# Patient Record
Sex: FEMALE | Race: ASIAN | NOT HISPANIC OR LATINO
[De-identification: names, ages, dates, MRNs, and addresses within clinical notes are randomized per-mention and may not be internally consistent; named-entity substitution may affect disease eponyms.]

---

## 2017-02-03 ENCOUNTER — APPOINTMENT (OUTPATIENT)
Dept: COLORECTAL SURGERY | Facility: CLINIC | Age: 47
End: 2017-02-03

## 2017-02-03 VITALS
HEIGHT: 63 IN | WEIGHT: 120 LBS | SYSTOLIC BLOOD PRESSURE: 111 MMHG | TEMPERATURE: 98.2 F | BODY MASS INDEX: 21.26 KG/M2 | RESPIRATION RATE: 14 BRPM | HEART RATE: 83 BPM | DIASTOLIC BLOOD PRESSURE: 75 MMHG

## 2017-02-03 DIAGNOSIS — Z12.11 ENCOUNTER FOR SCREENING FOR MALIGNANT NEOPLASM OF COLON: ICD-10-CM

## 2017-02-23 ENCOUNTER — MED ADMIN CHARGE (OUTPATIENT)
Age: 47
End: 2017-02-23

## 2017-03-23 ENCOUNTER — OUTPATIENT (OUTPATIENT)
Dept: OUTPATIENT SERVICES | Facility: HOSPITAL | Age: 47
LOS: 1 days | Discharge: HOME | End: 2017-03-23
Payer: COMMERCIAL

## 2017-03-23 VITALS
SYSTOLIC BLOOD PRESSURE: 110 MMHG | TEMPERATURE: 98 F | HEART RATE: 67 BPM | DIASTOLIC BLOOD PRESSURE: 79 MMHG | WEIGHT: 132.28 LBS | RESPIRATION RATE: 16 BRPM | OXYGEN SATURATION: 100 % | HEIGHT: 63 IN

## 2017-03-23 DIAGNOSIS — D25.9 LEIOMYOMA OF UTERUS, UNSPECIFIED: ICD-10-CM

## 2017-03-23 DIAGNOSIS — Z98.890 OTHER SPECIFIED POSTPROCEDURAL STATES: Chronic | ICD-10-CM

## 2017-03-23 DIAGNOSIS — Z12.11 ENCOUNTER FOR SCREENING FOR MALIGNANT NEOPLASM OF COLON: ICD-10-CM

## 2017-03-23 LAB
ANION GAP SERPL CALC-SCNC: 7 MMOL/L — SIGNIFICANT CHANGE UP (ref 5–17)
APPEARANCE UR: CLEAR — SIGNIFICANT CHANGE UP
APTT BLD: 34.7 SEC — SIGNIFICANT CHANGE UP (ref 27.5–37.4)
BACTERIA # UR AUTO: (no result)
BASOPHILS # BLD AUTO: 0.1 K/UL — SIGNIFICANT CHANGE UP (ref 0–0.2)
BASOPHILS NFR BLD AUTO: 1.1 % — SIGNIFICANT CHANGE UP (ref 0–2)
BILIRUB UR-MCNC: NEGATIVE — SIGNIFICANT CHANGE UP
BLD GP AB SCN SERPL QL: SIGNIFICANT CHANGE UP
BUN SERPL-MCNC: 19 MG/DL — SIGNIFICANT CHANGE UP (ref 7–23)
CALCIUM SERPL-MCNC: 8.6 MG/DL — SIGNIFICANT CHANGE UP (ref 8.5–10.1)
CHLORIDE SERPL-SCNC: 106 MMOL/L — SIGNIFICANT CHANGE UP (ref 96–108)
CO2 SERPL-SCNC: 27 MMOL/L — SIGNIFICANT CHANGE UP (ref 22–31)
COLOR SPEC: YELLOW — SIGNIFICANT CHANGE UP
CREAT SERPL-MCNC: 0.54 MG/DL — SIGNIFICANT CHANGE UP (ref 0.5–1.3)
DIFF PNL FLD: (no result)
EOSINOPHIL # BLD AUTO: 0.1 K/UL — SIGNIFICANT CHANGE UP (ref 0–0.5)
EOSINOPHIL NFR BLD AUTO: 2.5 % — SIGNIFICANT CHANGE UP (ref 0–6)
EPI CELLS # UR: SIGNIFICANT CHANGE UP
GLUCOSE SERPL-MCNC: 103 MG/DL — HIGH (ref 70–99)
GLUCOSE UR QL: NEGATIVE MG/DL — SIGNIFICANT CHANGE UP
HCT VFR BLD CALC: 34.4 % — LOW (ref 34.5–45)
HGB BLD-MCNC: 10.5 G/DL — LOW (ref 11.5–15.5)
INR BLD: 0.95 RATIO — SIGNIFICANT CHANGE UP (ref 0.88–1.16)
KETONES UR-MCNC: NEGATIVE — SIGNIFICANT CHANGE UP
LEUKOCYTE ESTERASE UR-ACNC: (no result)
LYMPHOCYTES # BLD AUTO: 1.6 K/UL — SIGNIFICANT CHANGE UP (ref 1–3.3)
LYMPHOCYTES # BLD AUTO: 28.5 % — SIGNIFICANT CHANGE UP (ref 13–44)
MCHC RBC-ENTMCNC: 23.7 PG — LOW (ref 27–34)
MCHC RBC-ENTMCNC: 30.4 GM/DL — LOW (ref 32–36)
MCV RBC AUTO: 77.9 FL — LOW (ref 80–100)
MONOCYTES # BLD AUTO: 0.4 K/UL — SIGNIFICANT CHANGE UP (ref 0–0.9)
MONOCYTES NFR BLD AUTO: 7.8 % — SIGNIFICANT CHANGE UP (ref 2–14)
NEUTROPHILS # BLD AUTO: 3.3 K/UL — SIGNIFICANT CHANGE UP (ref 1.8–7.4)
NEUTROPHILS NFR BLD AUTO: 60.2 % — SIGNIFICANT CHANGE UP (ref 43–77)
NITRITE UR-MCNC: NEGATIVE — SIGNIFICANT CHANGE UP
PH UR: 5 — SIGNIFICANT CHANGE UP (ref 4.8–8)
PLATELET # BLD AUTO: 309 K/UL — SIGNIFICANT CHANGE UP (ref 150–400)
POTASSIUM SERPL-MCNC: 4.3 MMOL/L — SIGNIFICANT CHANGE UP (ref 3.5–5.3)
POTASSIUM SERPL-SCNC: 4.3 MMOL/L — SIGNIFICANT CHANGE UP (ref 3.5–5.3)
PROT UR-MCNC: NEGATIVE MG/DL — SIGNIFICANT CHANGE UP
PROTHROM AB SERPL-ACNC: 10.3 SEC — SIGNIFICANT CHANGE UP (ref 9.8–12.7)
RBC # BLD: 4.42 M/UL — SIGNIFICANT CHANGE UP (ref 3.8–5.2)
RBC # FLD: 13.6 % — SIGNIFICANT CHANGE UP (ref 10.3–14.5)
RBC CASTS # UR COMP ASSIST: (no result) /HPF (ref 0–4)
SODIUM SERPL-SCNC: 140 MMOL/L — SIGNIFICANT CHANGE UP (ref 135–145)
SP GR SPEC: 1.01 — SIGNIFICANT CHANGE UP (ref 1.01–1.02)
TYPE + AB SCN PNL BLD: SIGNIFICANT CHANGE UP
UROBILINOGEN FLD QL: NEGATIVE MG/DL — SIGNIFICANT CHANGE UP
WBC # BLD: 5.4 K/UL — SIGNIFICANT CHANGE UP (ref 3.8–10.5)
WBC # FLD AUTO: 5.4 K/UL — SIGNIFICANT CHANGE UP (ref 3.8–10.5)
WBC UR QL: SIGNIFICANT CHANGE UP

## 2017-03-23 PROCEDURE — 93010 ELECTROCARDIOGRAM REPORT: CPT

## 2017-03-23 NOTE — H&P PST ADULT - HISTORY OF PRESENT ILLNESS
1.	This is a 45 y/o female with a hx of Right LE DVT S/P surgery (removal of DVT) on Right LE (not on anticoagulation). Pt reports she has a regular menstrual cycle that occurs approximately every 30 days. She reports that on the second day she has increased bleeding and abdominal pain when she is unable to walk. She was diagnosed with fibroids and is now scheduled for a supracervical hysterectomy.

## 2017-03-23 NOTE — H&P PST ADULT - NEGATIVE GENERAL GENITOURINARY SYMPTOMS
no flank pain L/no renal colic/no hematuria/no dysuria/no flank pain R/no incontinence/no bladder infections/no urinary hesitancy

## 2017-03-23 NOTE — H&P PST ADULT - ASSESSMENT
This is a 45 y/o female with menorrhagia scheduled for a supracervical hysterectomy with Dr. Vargas This is a 45 y/o female with menorrhagia scheduled for a supracervical hysterectomy with Dr. Vargas    Patient instructed on     1. NPO post midnight of surgery  2. On the use of EZ sponges  3. Aware that she needs medical clearance

## 2017-03-23 NOTE — H&P PST ADULT - PSH
H/O abdominal surgery  2008  H/O vascular surgery  S/P evacuation of blood clot in right leg 11/2016

## 2017-03-29 ENCOUNTER — RESULT REVIEW (OUTPATIENT)
Age: 47
End: 2017-03-29

## 2017-03-29 RX ORDER — ACETAMINOPHEN 500 MG
975 TABLET ORAL ONCE
Qty: 0 | Refills: 0 | Status: COMPLETED | OUTPATIENT
Start: 2017-03-30 | End: 2017-03-30

## 2017-03-29 RX ORDER — CELECOXIB 200 MG/1
200 CAPSULE ORAL ONCE
Qty: 0 | Refills: 0 | Status: COMPLETED | OUTPATIENT
Start: 2017-03-30 | End: 2017-03-30

## 2017-03-29 RX ORDER — OXYCODONE HYDROCHLORIDE 5 MG/1
10 TABLET ORAL ONCE
Qty: 0 | Refills: 0 | Status: DISCONTINUED | OUTPATIENT
Start: 2017-03-30 | End: 2017-03-30

## 2017-03-29 RX ORDER — SODIUM CHLORIDE 9 MG/ML
3 INJECTION INTRAMUSCULAR; INTRAVENOUS; SUBCUTANEOUS EVERY 8 HOURS
Qty: 0 | Refills: 0 | Status: DISCONTINUED | OUTPATIENT
Start: 2017-03-30 | End: 2017-04-01

## 2017-03-30 ENCOUNTER — APPOINTMENT (OUTPATIENT)
Dept: COLORECTAL SURGERY | Facility: HOSPITAL | Age: 47
End: 2017-03-30

## 2017-03-30 ENCOUNTER — INPATIENT (INPATIENT)
Facility: HOSPITAL | Age: 47
LOS: 1 days | Discharge: HOME | End: 2017-04-01
Attending: OBSTETRICS & GYNECOLOGY | Admitting: OBSTETRICS & GYNECOLOGY
Payer: COMMERCIAL

## 2017-03-30 ENCOUNTER — APPOINTMENT (OUTPATIENT)
Dept: OBGYN | Facility: HOSPITAL | Age: 47
End: 2017-03-30

## 2017-03-30 VITALS
RESPIRATION RATE: 15 BRPM | OXYGEN SATURATION: 98 % | HEART RATE: 68 BPM | WEIGHT: 132.28 LBS | DIASTOLIC BLOOD PRESSURE: 80 MMHG | TEMPERATURE: 98 F | SYSTOLIC BLOOD PRESSURE: 113 MMHG

## 2017-03-30 DIAGNOSIS — Z98.890 OTHER SPECIFIED POSTPROCEDURAL STATES: Chronic | ICD-10-CM

## 2017-03-30 LAB
ANION GAP SERPL CALC-SCNC: 9 MMOL/L — SIGNIFICANT CHANGE UP (ref 5–17)
BASOPHILS # BLD AUTO: 0 K/UL — SIGNIFICANT CHANGE UP (ref 0–0.2)
BASOPHILS NFR BLD AUTO: 0.3 % — SIGNIFICANT CHANGE UP (ref 0–2)
BUN SERPL-MCNC: 14 MG/DL — SIGNIFICANT CHANGE UP (ref 7–23)
CALCIUM SERPL-MCNC: 7.7 MG/DL — LOW (ref 8.5–10.1)
CHLORIDE SERPL-SCNC: 108 MMOL/L — SIGNIFICANT CHANGE UP (ref 96–108)
CO2 SERPL-SCNC: 23 MMOL/L — SIGNIFICANT CHANGE UP (ref 22–31)
CREAT SERPL-MCNC: 0.52 MG/DL — SIGNIFICANT CHANGE UP (ref 0.5–1.3)
EOSINOPHIL # BLD AUTO: 0 K/UL — SIGNIFICANT CHANGE UP (ref 0–0.5)
EOSINOPHIL NFR BLD AUTO: 0 % — SIGNIFICANT CHANGE UP (ref 0–6)
GLUCOSE SERPL-MCNC: 104 MG/DL — HIGH (ref 70–99)
HBA1C BLD-MCNC: 5.9 % — HIGH (ref 4–5.6)
HCG UR QL: NEGATIVE — SIGNIFICANT CHANGE UP
HCT VFR BLD CALC: 32.3 % — LOW (ref 34.5–45)
HGB BLD-MCNC: 10 G/DL — LOW (ref 11.5–15.5)
LYMPHOCYTES # BLD AUTO: 0.6 K/UL — LOW (ref 1–3.3)
LYMPHOCYTES # BLD AUTO: 5.4 % — LOW (ref 13–44)
MCHC RBC-ENTMCNC: 23.7 PG — LOW (ref 27–34)
MCHC RBC-ENTMCNC: 31.1 GM/DL — LOW (ref 32–36)
MCV RBC AUTO: 76.2 FL — LOW (ref 80–100)
MONOCYTES # BLD AUTO: 0.2 K/UL — SIGNIFICANT CHANGE UP (ref 0–0.9)
MONOCYTES NFR BLD AUTO: 1.6 % — LOW (ref 2–14)
NEUTROPHILS # BLD AUTO: 11.2 K/UL — HIGH (ref 1.8–7.4)
NEUTROPHILS NFR BLD AUTO: 92.8 % — HIGH (ref 43–77)
PLATELET # BLD AUTO: 382 K/UL — SIGNIFICANT CHANGE UP (ref 150–400)
POTASSIUM SERPL-MCNC: 4.1 MMOL/L — SIGNIFICANT CHANGE UP (ref 3.5–5.3)
POTASSIUM SERPL-SCNC: 4.1 MMOL/L — SIGNIFICANT CHANGE UP (ref 3.5–5.3)
RBC # BLD: 4.24 M/UL — SIGNIFICANT CHANGE UP (ref 3.8–5.2)
RBC # FLD: 13.4 % — SIGNIFICANT CHANGE UP (ref 10.3–14.5)
SODIUM SERPL-SCNC: 140 MMOL/L — SIGNIFICANT CHANGE UP (ref 135–145)
WBC # BLD: 12.1 K/UL — HIGH (ref 3.8–10.5)
WBC # FLD AUTO: 12.1 K/UL — HIGH (ref 3.8–10.5)

## 2017-03-30 PROCEDURE — 88307 TISSUE EXAM BY PATHOLOGIST: CPT | Mod: 26

## 2017-03-30 PROCEDURE — 44005 FREEING OF BOWEL ADHESION: CPT

## 2017-03-30 PROCEDURE — 58180 PARTIAL HYSTERECTOMY: CPT

## 2017-03-30 RX ORDER — ONDANSETRON 8 MG/1
4 TABLET, FILM COATED ORAL ONCE
Qty: 0 | Refills: 0 | Status: DISCONTINUED | OUTPATIENT
Start: 2017-03-30 | End: 2017-03-30

## 2017-03-30 RX ORDER — SODIUM CHLORIDE 9 MG/ML
1000 INJECTION INTRAMUSCULAR; INTRAVENOUS; SUBCUTANEOUS
Qty: 0 | Refills: 0 | Status: DISCONTINUED | OUTPATIENT
Start: 2017-03-30 | End: 2017-03-30

## 2017-03-30 RX ORDER — ONDANSETRON 8 MG/1
4 TABLET, FILM COATED ORAL EVERY 6 HOURS
Qty: 0 | Refills: 0 | Status: DISCONTINUED | OUTPATIENT
Start: 2017-03-30 | End: 2017-04-01

## 2017-03-30 RX ORDER — HEPARIN SODIUM 5000 [USP'U]/ML
5000 INJECTION INTRAVENOUS; SUBCUTANEOUS EVERY 12 HOURS
Qty: 0 | Refills: 0 | Status: DISCONTINUED | OUTPATIENT
Start: 2017-03-30 | End: 2017-03-31

## 2017-03-30 RX ORDER — SODIUM CHLORIDE 9 MG/ML
1000 INJECTION, SOLUTION INTRAVENOUS
Qty: 0 | Refills: 0 | Status: DISCONTINUED | OUTPATIENT
Start: 2017-03-30 | End: 2017-04-01

## 2017-03-30 RX ORDER — HYDROMORPHONE HYDROCHLORIDE 2 MG/ML
30 INJECTION INTRAMUSCULAR; INTRAVENOUS; SUBCUTANEOUS
Qty: 0 | Refills: 0 | Status: DISCONTINUED | OUTPATIENT
Start: 2017-03-30 | End: 2017-03-31

## 2017-03-30 RX ORDER — NALOXONE HYDROCHLORIDE 4 MG/.1ML
0.1 SPRAY NASAL
Qty: 0 | Refills: 0 | Status: DISCONTINUED | OUTPATIENT
Start: 2017-03-30 | End: 2017-04-01

## 2017-03-30 RX ORDER — HYDROMORPHONE HYDROCHLORIDE 2 MG/ML
0.5 INJECTION INTRAMUSCULAR; INTRAVENOUS; SUBCUTANEOUS
Qty: 0 | Refills: 0 | Status: DISCONTINUED | OUTPATIENT
Start: 2017-03-30 | End: 2017-03-31

## 2017-03-30 RX ADMIN — SODIUM CHLORIDE 100 MILLILITER(S): 9 INJECTION, SOLUTION INTRAVENOUS at 22:04

## 2017-03-30 RX ADMIN — ONDANSETRON 4 MILLIGRAM(S): 8 TABLET, FILM COATED ORAL at 20:24

## 2017-03-30 RX ADMIN — CELECOXIB 200 MILLIGRAM(S): 200 CAPSULE ORAL at 06:48

## 2017-03-30 RX ADMIN — OXYCODONE HYDROCHLORIDE 10 MILLIGRAM(S): 5 TABLET ORAL at 06:48

## 2017-03-30 RX ADMIN — Medication 975 MILLIGRAM(S): at 06:48

## 2017-03-30 RX ADMIN — SODIUM CHLORIDE 100 MILLILITER(S): 9 INJECTION, SOLUTION INTRAVENOUS at 12:20

## 2017-03-30 RX ADMIN — HEPARIN SODIUM 5000 UNIT(S): 5000 INJECTION INTRAVENOUS; SUBCUTANEOUS at 18:00

## 2017-03-30 NOTE — PATIENT PROFILE ADULT. - FUNCTIONAL SCREEN CURRENT LEVEL: COMMUNICATION, MLM
(0) understands/communicates without difficulty/refuses translation phone.  understands and speaks english well

## 2017-03-30 NOTE — BRIEF OPERATIVE NOTE - PROCEDURE
Bilateral salpingectomy by abdominal approach  03/30/2017    Active  [email or web address]  Abdominal hysterectomy  03/30/2017    Active  [email or web address]

## 2017-03-30 NOTE — BRIEF OPERATIVE NOTE - POST-OP DX
Menorrhagia with regular cycle  03/30/2017    Active  Jan Vargas  Uterine leiomyoma, unspecified location  03/30/2017    Active  Jan Vargas

## 2017-03-31 PROCEDURE — 99222 1ST HOSP IP/OBS MODERATE 55: CPT

## 2017-03-31 RX ORDER — ACETAMINOPHEN 500 MG
650 TABLET ORAL EVERY 6 HOURS
Qty: 0 | Refills: 0 | Status: DISCONTINUED | OUTPATIENT
Start: 2017-03-31 | End: 2017-04-01

## 2017-03-31 RX ORDER — ENOXAPARIN SODIUM 100 MG/ML
40 INJECTION SUBCUTANEOUS DAILY
Qty: 0 | Refills: 0 | Status: DISCONTINUED | OUTPATIENT
Start: 2017-03-31 | End: 2017-04-01

## 2017-03-31 RX ORDER — HYDROMORPHONE HYDROCHLORIDE 2 MG/ML
2 INJECTION INTRAMUSCULAR; INTRAVENOUS; SUBCUTANEOUS
Qty: 0 | Refills: 0 | Status: DISCONTINUED | OUTPATIENT
Start: 2017-03-31 | End: 2017-04-01

## 2017-03-31 RX ORDER — ENOXAPARIN SODIUM 100 MG/ML
40 INJECTION SUBCUTANEOUS
Qty: 30 | Refills: 0 | OUTPATIENT
Start: 2017-03-31 | End: 2017-04-30

## 2017-03-31 RX ADMIN — HYDROMORPHONE HYDROCHLORIDE 2 MILLIGRAM(S): 2 INJECTION INTRAMUSCULAR; INTRAVENOUS; SUBCUTANEOUS at 14:44

## 2017-03-31 RX ADMIN — ENOXAPARIN SODIUM 40 MILLIGRAM(S): 100 INJECTION SUBCUTANEOUS at 13:01

## 2017-03-31 RX ADMIN — HEPARIN SODIUM 5000 UNIT(S): 5000 INJECTION INTRAVENOUS; SUBCUTANEOUS at 05:57

## 2017-03-31 RX ADMIN — HYDROMORPHONE HYDROCHLORIDE 2 MILLIGRAM(S): 2 INJECTION INTRAMUSCULAR; INTRAVENOUS; SUBCUTANEOUS at 15:07

## 2017-03-31 RX ADMIN — SODIUM CHLORIDE 100 MILLILITER(S): 9 INJECTION, SOLUTION INTRAVENOUS at 16:14

## 2017-03-31 RX ADMIN — SODIUM CHLORIDE 100 MILLILITER(S): 9 INJECTION, SOLUTION INTRAVENOUS at 05:55

## 2017-03-31 RX ADMIN — Medication 650 MILLIGRAM(S): at 18:33

## 2017-03-31 RX ADMIN — SODIUM CHLORIDE 3 MILLILITER(S): 9 INJECTION INTRAMUSCULAR; INTRAVENOUS; SUBCUTANEOUS at 20:34

## 2017-03-31 NOTE — CHART NOTE - NSCHARTNOTEFT_GEN_A_CORE
Postoperative day: 1 Subtotal hysterectomy,bilateral salpingectomies    Subjective:  The patient feels well.  She is ambulating.  She is tolerating a regular diet.  She denies nausea and vomiting.  She is passing flatus.  She denies chest pain or shortness of breath.    Physical exam:    Vital Signs Last 24 Hrs  T(C): 37.1, Max: 37.1 (03-30 @ 21:05)  T(F): 98.8, Max: 98.8 (03-30 @ 21:05)  HR: 83 (67 - 85)  BP: 115/70 (107/65 - 132/85)  BP(mean): --  RR: 16 (13 - 18)  SpO2: 100% (99% - 100%)    Gen: NAD  Abdomen: Soft,  appropriately tender  Incision is clean dry and intact  Ext: No calf tenderness  SCD's in place and working    LABS:                        10.0   12.1  )-----------( 382      ( 30 Mar 2017 12:16 )             32.3     30 Mar 2017 12:16    140    |  108    |  14     ----------------------------<  104    4.1     |  23     |  0.52     Ca    7.7        30 Mar 2017 12:16        Allergies    No Known Allergies    Intolerances        RADIOLOGY & ADDITIONAL TESTS:    Assessment and Plan  POD # 1 s/p ELAP, subtotal hysterectomy, bilateral salpingectomies  Doing well.  Tolerating regular diet.  Encouraged ambulation.  Routine post op care.
post op check    vss afeb  output clear and adeq  abd soft nt  dressing=clean, dry and intact  stable  cont prog care

## 2017-03-31 NOTE — CONSULT NOTE ADULT - SUBJECTIVE AND OBJECTIVE BOX
HPI:  Patient is a 46y old  Female who presents with a chief complaint of bleeding (30 Mar 2017 06:32)  Patient s/p Bilateral salpingectomy by abdominal approach  03/30/2017    and adominal hysterectomy  03/30/2017      Consulted by Dr. Jan Vargas  for VTE prophylaxis, risk stratification, and anticoagulation management.     PAST MEDICAL & SURGICAL HISTORY:  DVT (deep venous thrombosis): in Right LE   Fibroids  Menorrhagia  Anemia  H/O vascular surgery: S/P evacuation of blood clot in right leg 11/2016  H/O abdominal surgery: 2008  bmi 23.4    Caprini VTE Risk Score:6     IMPROVE Bleeding Risk Score: 1.5    Falls Risk:   High (  )  Mod (  )  Low ( x )    EBL: 50  ml  crcl: 128.04  3-31-17 pt bimal t bedside on 2sw mother in law and father in law present.   Discussed her hx of rt e dvt in November of 2016.  She states she had a small clot after a procedure onher .eg was on medication for 6 weeks and the lump was removed. Does not rememeber name of medicationa nd she states"I am fine"  Pt made aware of risks of dvt s/p surg.  Informed her we will put her on prophylaxis dose of lovenox for four weeks.  Pt willing to learn self inj. questions anwered will reinforce as needed.    Vital Signs Last 24 Hrs  T(C): 36.9, Max: 37.1 (03-30 @ 21:05)  T(F): 98.4, Max: 98.8 (03-30 @ 21:05)  HR: 73 (69 - 85)  BP: 118/73 (107/65 - 124/80)  BP(mean): --  RR: 16 (16 - 18)  SpO2: 100% (99% - 100%)  FAMILY HISTORY:  No pertinent family history in first degree relatives    Denies any personal or familial history of clotting or bleeding disorders.    Allergies    No Known Allergies    Intolerances        REVIEW OF SYSTEMS    (  )Fever	     (  )Constipation	(  )SOB				(  )Headache	(  )Dysuria  (  )Chills	     (  )Melena	(  )Dyspnea present on exertion	                    (  )Dizziness                    (  )Polyuria  (  )Nausea	     (  )Hematochezia	(  )Cough			                    (  )Syncope   	(  )Hematuria  (  )Vomiting    (  )Chest Pain	(  )Wheezing			(  )Weakness  (  )Diarrhea     (  )Palpitations	(  )Anorexia			(  )Myalgia    All other review of systems negative: Yes      PHYSICAL EXAM:    Constitutional: Appears Well    Neurological: A& O x 3    Skin: Warm    Respiratory and Thorax: normal effort; Breath sounds: normal; No rales/wheezing/rhonchi  	  Cardiovascular: S1, S2, regular, NMBR	    Gastrointestinal: BS decrease  x 4Q,  generalized tenderness, dsg noted to lower abd D&I	    Genitourinary:  Bladder nondistended, nontender    Musculoskeletal:   General Right:   no muscle/joint tenderness,   normal tone, no joint swelling,   ROM: lfull	    General Left:   no muscle/joint tenderness,   normal tone, no joint swelling,   ROM: full    Lower extrems:   Right: no calf tenderness              negative benita's sign               + pedal pulses    Left:   no calf tenderness              negative benita's sign               + pedal pulses                          10.0   12.1  )-----------( 382      ( 30 Mar 2017 12:16 )             32.3       30 Mar 2017 12:16    140    |  108    |  14     ----------------------------<  104    4.1     |  23     |  0.52     Ca    7.7        30 Mar 2017 12:16        				    MEDICATIONS  (STANDING):  sodium chloride 0.9% lock flush 3milliLiter(s) IV Push every 8 hours  lactated ringers. 1000milliLiter(s) IV Continuous <Continuous>  enoxaparin Injectable 40milliGRAM(s) SubCutaneous daily        DVT Prophylaxis:  LMWH                   (X  )  Heparin SQ           (  )  Coumadin             (  )  Xarelto                  (  )  Eliquis                   (  )  Venodynes           (X  )  Ambulation          ( X )  UFH                       (  )  Contraindicated  (  )

## 2017-03-31 NOTE — PROGRESS NOTE ADULT - ASSESSMENT
POD 1 s/p abdominal hysterectomy and bilateral salpingectomy for menorrhagia and uterine leiomyoma. Doing well, nausea improved.     Diet as tolerated  Ambulate and IS  VTE prophylaxis which she would likely need to continue on given history of thrombophlebitis

## 2017-03-31 NOTE — CONSULT NOTE ADULT - ASSESSMENT
This is a 46 year old female s/p  bilateral salpingectomy by abdominal approach  03/30/2017    and adominal hysterectomy  03/30/2017.  Pt has a hx of provoked DVT .  Pt requires prophylaxis anticoagulation.    lovenox 40m qs daily for 3-4 weeks.  daily cbc  :le venodynes  :oob as per md  :thanks for consult will f/u

## 2017-04-01 ENCOUNTER — TRANSCRIPTION ENCOUNTER (OUTPATIENT)
Age: 47
End: 2017-04-01

## 2017-04-01 VITALS
TEMPERATURE: 98 F | SYSTOLIC BLOOD PRESSURE: 109 MMHG | OXYGEN SATURATION: 99 % | RESPIRATION RATE: 18 BRPM | HEART RATE: 89 BPM | DIASTOLIC BLOOD PRESSURE: 64 MMHG

## 2017-04-01 PROCEDURE — 99233 SBSQ HOSP IP/OBS HIGH 50: CPT

## 2017-04-01 RX ORDER — ENOXAPARIN SODIUM 100 MG/ML
40 INJECTION SUBCUTANEOUS
Qty: 0 | Refills: 0 | COMMUNITY
Start: 2017-04-01

## 2017-04-01 RX ORDER — ACETAMINOPHEN 500 MG
2 TABLET ORAL
Qty: 0 | Refills: 0 | COMMUNITY
Start: 2017-04-01

## 2017-04-01 RX ADMIN — ENOXAPARIN SODIUM 40 MILLIGRAM(S): 100 INJECTION SUBCUTANEOUS at 12:57

## 2017-04-01 RX ADMIN — SODIUM CHLORIDE 100 MILLILITER(S): 9 INJECTION, SOLUTION INTRAVENOUS at 02:51

## 2017-04-01 RX ADMIN — SODIUM CHLORIDE 3 MILLILITER(S): 9 INJECTION INTRAMUSCULAR; INTRAVENOUS; SUBCUTANEOUS at 05:15

## 2017-04-01 RX ADMIN — Medication 650 MILLIGRAM(S): at 00:03

## 2017-04-01 NOTE — PROGRESS NOTE ADULT - ASSESSMENT
This is a 46 year old female s/p  bilateral salpingectomy by abdominal approach  03/30/2017    and adominal hysterectomy  03/30/2017.  Pt has a hx of provoked DVT .  Pt requires prophylaxis anticoagulation.    lovenox 40m qs daily for 3-4 weeks.  daily cbc  :le venodynes  :oob as per md  : will f/u  with pt on discharge with anticoagulation services.

## 2017-04-01 NOTE — DISCHARGE NOTE ADULT - CARE PROVIDER_API CALL
Jan Vargas (MD), Obstetrics and Gynecology  [address] [place] [address]  Phone: [phone]  Fax: [phone]

## 2017-04-01 NOTE — PROGRESS NOTE ADULT - SUBJECTIVE AND OBJECTIVE BOX
Postoperative day: 2 Subtotal hysterectomy,bilateral salpingectomies    Subjective:  The patient feels well.  She is ambulating.  She is tolerating a regular diet.  She denies nausea and vomiting.  She is passing flatus.  She denies chest pain or shortness of breath.    Physical exam:    Vital Signs Last 24 Hrs  T(C): 37, Max: 37 (03-31 @ 23:35)  T(F): 98.6, Max: 98.6 (03-31 @ 23:35)  HR: 75 (73 - 103)  BP: 112/71 (98/68 - 118/73)  BP(mean): --  RR: 18 (16 - 18)  SpO2: 96% (96% - 100%)    Gen: NAD  Abdomen: Soft,  appropriately tender  Incision is clean dry and intact  Ext: No calf tenderness  SCD's in place and working    LABS:                        10.0   12.1  )-----------( 382      ( 30 Mar 2017 12:16 )             32.3     30 Mar 2017 12:16    140    |  108    |  14     ----------------------------<  104    4.1     |  23     |  0.52     Ca    7.7        30 Mar 2017 12:16        Allergies    No Known Allergies    Intolerances        RADIOLOGY & ADDITIONAL TESTS:    Assessment and Plan  POD # 2 s/p ELAP, subtotal hysterectomy, bilateral salpingectomies  Doing well.  Tolerating regular diet.  Encouraged ambulation.  Routine post op care.   discharge home today on lovenox 40mg sc daily  pt given instructions
HPI:  Patient is a 46y old  Female who presents with a chief complaint of bleeding (30 Mar 2017 06:32)  Patient s/p Bilateral salpingectomy by abdominal approach  03/30/2017    and adominal hysterectomy  03/30/2017      Consulted by Dr. Jan Vargas  for VTE prophylaxis, risk stratification, and anticoagulation management.     PAST MEDICAL & SURGICAL HISTORY:  DVT (deep venous thrombosis): in Right LE   Fibroids  Menorrhagia  Anemia  H/O vascular surgery: S/P evacuation of blood clot in right leg 11/2016  H/O abdominal surgery: 2008  bmi 23.4    Caprini VTE Risk Score:6     IMPROVE Bleeding Risk Score: 1.5    Falls Risk:   High (  )  Mod (  )  Low ( x )    EBL: 50  ml  crcl: 128.04  3-31-17 pt bimal t bedside on 2sw mother in law and father in law present.   Discussed her hx of rt e dvt in November of 2016.  She states she had a small clot after a procedure onher .eg was on medication for 6 weeks and the lump was removed. Does not rememeber name of medicationa nd she states"I am fine"  Pt made aware of risks of dvt s/p surg.  Informed her we will put her on prophylaxis dose of lovenox for four weeks.  Pt willing to learn self inj. questions anwered will reinforce as needed.  4-1-17 pt seen at bedside sleepy easily aroused.  Discussed her anticoagulation with Lovenox inj or four weeks no concerns. will f/u with pt on discharge.  Vital Signs Last 24 Hrs  T(C): 36.9, Max: 37.1 (03-30 @ 21:05)  T(F): 98.4, Max: 98.8 (03-30 @ 21:05)  HR: 73 (69 - 85)  BP: 118/73 (107/65 - 124/80)  BP(mean): --  RR: 16 (16 - 18)  SpO2: 100% (99% - 100%)  FAMILY HISTORY:  No pertinent family history in first degree relatives    Denies any personal or familial history of clotting or bleeding disorders.    Allergies    No Known Allergies    Intolerances        REVIEW OF SYSTEMS    (  )Fever	     (  )Constipation	(  )SOB				(  )Headache	(  )Dysuria  (  )Chills	     (  )Melena	(  )Dyspnea present on exertion	                    (  )Dizziness                    (  )Polyuria  (  )Nausea	     (  )Hematochezia	(  )Cough			                    (  )Syncope   	(  )Hematuria  (  )Vomiting    (  )Chest Pain	(  )Wheezing			(  )Weakness  (  )Diarrhea     (  )Palpitations	(  )Anorexia			(  )Myalgia    All other review of systems negative: Yes      PHYSICAL EXAM:    Constitutional: Appears Well    Neurological: A& O x 3    Skin: Warm    Respiratory and Thorax: normal effort; Breath sounds: normal; No rales/wheezing/rhonchi  	  Cardiovascular: S1, S2, regular, NMBR	    Gastrointestinal: BS decrease  x 4Q,  generalized tenderness, dsg noted to lower abd D&I	    Genitourinary:  Bladder nondistended, nontender    Musculoskeletal:   General Right:   no muscle/joint tenderness,   normal tone, no joint swelling,   ROM: lfull	    General Left:   no muscle/joint tenderness,   normal tone, no joint swelling,   ROM: full    Lower extrems:   Right: no calf tenderness              negative benita's sign               + pedal pulses    Left:   no calf tenderness              negative benita's sign               + pedal pulses                          10.0   12.1  )-----------( 382      ( 30 Mar 2017 12:16 )             32.3       30 Mar 2017 12:16    140    |  108    |  14     ----------------------------<  104    4.1     |  23     |  0.52     Ca    7.7        30 Mar 2017 12:16        				    MEDICATIONS  (STANDING):  sodium chloride 0.9% lock flush 3milliLiter(s) IV Push every 8 hours  lactated ringers. 1000milliLiter(s) IV Continuous <Continuous>  enoxaparin Injectable 40milliGRAM(s) SubCutaneous daily        DVT Prophylaxis:  LMWH                   (X  )  Heparin SQ           (  )  Coumadin             (  )  Xarelto                  (  )  Eliquis                   (  )  Venodynes           (X  )  Ambulation          ( X )  UFH                       (  )  Contraindicated  (  )
Had nausea and emesis yesterday but feels better this morning. No fevers or chills. Pain well controlled    Exam:  Vital Signs Last 24 Hrs  T(C): 37.1, Max: 37.1 (03-30 @ 21:05)  T(F): 98.8, Max: 98.8 (03-30 @ 21:05)  HR: 83 (61 - 85)  BP: 115/70 (107/65 - 142/88)  RR: 16 (13 - 18)  SpO2: 100% (99% - 100%)    General: in no distress  Respiratory: non labored breathing  Abdomen: soft, mild distension, appropriately tender, incision covered with dressing and intact.  Neuro: alert and oriented    Labs pending

## 2017-04-01 NOTE — DISCHARGE NOTE ADULT - CARE PLAN
Principal Discharge DX:	Uterine leiomyoma, unspecified location  Goal:	home  Instructions for follow-up, activity and diet:	reg diet  lovenox 40mg sc daily  Secondary Diagnosis:	Menorrhagia with regular cycle

## 2017-04-01 NOTE — DISCHARGE NOTE ADULT - PATIENT PORTAL LINK FT
“You can access the Follow[employer] Patient Portal, offered by [facility], by registering with the following website: [email or web address]”

## 2017-04-01 NOTE — DISCHARGE NOTE ADULT - MEDICATION SUMMARY - MEDICATIONS TO TAKE
I will START or STAY ON the medications listed below when I get home from the hospital:    acetaminophen 325 mg oral tablet  -- 2 tab(s) by mouth every 6 hours, As needed, Headache/ Mild Pain (1-3)  -- Indication: For FIBROID UTERUS    enoxaparin  -- 40 milligram(s) subcutaneous once a day  -- Indication: For FIBROID UTERUS    enoxaparin 40 mg/0.4 mL injectable solution  -- 40 milligram(s) injectable once a day MDD:40mg take as directed by anticoagulation service  -- It is very important that you take or use this exactly as directed.  Do not skip doses or discontinue unless directed by your doctor.    -- Indication: For FIBROID UTERUS I will START or STAY ON the medications listed below when I get home from the hospital:    acetaminophen 325 mg oral tablet  -- 2 tab(s) by mouth every 6 hours, As needed, Headache/ Mild Pain (1-3)  -- Indication: For FIBROID UTERUS    Percocet 5/325 oral tablet  -- 1 tab(s) by mouth every 4 hours x 5 days, As Needed -for moderate pain MDD:6 tabs  -- Caution federal law prohibits the transfer of this drug to any person other  than the person for whom it was prescribed.  May cause drowsiness.  Alcohol may intensify this effect.  Use care when operating dangerous machinery.  This prescription cannot be refilled.  This product contains acetaminophen.  Do not use  with any other product containing acetaminophen to prevent possible liver damage.  Using more of this medication than prescribed may cause serious breathing problems.    -- Indication: For pain    enoxaparin 40 mg/0.4 mL injectable solution  -- 40 milligram(s) injectable once a day MDD:40mg take as directed by anticoagulation service  -- It is very important that you take or use this exactly as directed.  Do not skip doses or discontinue unless directed by your doctor.    -- Indication: For FIBROID UTERUS    enoxaparin  -- 40 milligram(s) subcutaneous once a day  -- Indication: For FIBROID UTERUS

## 2017-04-04 DIAGNOSIS — D25.9 LEIOMYOMA OF UTERUS, UNSPECIFIED: ICD-10-CM

## 2017-04-04 DIAGNOSIS — Z86.718 PERSONAL HISTORY OF OTHER VENOUS THROMBOSIS AND EMBOLISM: ICD-10-CM

## 2017-04-04 DIAGNOSIS — N92.0 EXCESSIVE AND FREQUENT MENSTRUATION WITH REGULAR CYCLE: ICD-10-CM

## 2017-04-04 LAB — SURGICAL PATHOLOGY FINAL REPORT - CH: SIGNIFICANT CHANGE UP

## 2017-04-05 ENCOUNTER — RESULT CHARGE (OUTPATIENT)
Age: 47
End: 2017-04-05

## 2017-04-06 ENCOUNTER — APPOINTMENT (OUTPATIENT)
Dept: OBGYN | Facility: CLINIC | Age: 47
End: 2017-04-06

## 2017-04-06 VITALS
WEIGHT: 120 LBS | HEIGHT: 63 IN | DIASTOLIC BLOOD PRESSURE: 60 MMHG | TEMPERATURE: 98.4 F | SYSTOLIC BLOOD PRESSURE: 90 MMHG | BODY MASS INDEX: 21.26 KG/M2

## 2017-04-06 LAB
BILIRUB UR QL STRIP: NORMAL
CLARITY UR: NORMAL
COLLECTION METHOD: NORMAL
GLUCOSE UR-MCNC: NORMAL
HCG UR QL: 0.2 EU/DL
HGB UR QL STRIP.AUTO: NORMAL
KETONES UR-MCNC: NORMAL
LEUKOCYTE ESTERASE UR QL STRIP: NORMAL
NITRITE UR QL STRIP: NORMAL
PH UR STRIP: 5.5
PROT UR STRIP-MCNC: NORMAL
SP GR UR STRIP: 1.02

## 2017-04-07 LAB
APPEARANCE: ABNORMAL
BACTERIA: ABNORMAL
BILIRUBIN URINE: NEGATIVE
BLOOD URINE: NEGATIVE
COLOR: YELLOW
GLUCOSE QUALITATIVE U: NORMAL MG/DL
HYALINE CASTS: 6 /LPF
KETONES URINE: NEGATIVE
LEUKOCYTE ESTERASE URINE: ABNORMAL
MICROSCOPIC-UA: NORMAL
NITRITE URINE: NEGATIVE
PH URINE: 5.5
PROTEIN URINE: NEGATIVE MG/DL
RED BLOOD CELLS URINE: 3 /HPF
SPECIFIC GRAVITY URINE: 1.02
SQUAMOUS EPITHELIAL CELLS: 19 /HPF
UROBILINOGEN URINE: NORMAL MG/DL
WHITE BLOOD CELLS URINE: 8 /HPF

## 2017-04-10 LAB — BACTERIA UR CULT: NORMAL

## 2017-04-12 ENCOUNTER — APPOINTMENT (OUTPATIENT)
Dept: OBGYN | Facility: CLINIC | Age: 47
End: 2017-04-12

## 2017-04-12 VITALS
WEIGHT: 130 LBS | HEIGHT: 63 IN | BODY MASS INDEX: 23.04 KG/M2 | SYSTOLIC BLOOD PRESSURE: 102 MMHG | DIASTOLIC BLOOD PRESSURE: 62 MMHG

## 2017-04-12 DIAGNOSIS — Z09 ENCOUNTER FOR FOLLOW-UP EXAMINATION AFTER COMPLETED TREATMENT FOR CONDITIONS OTHER THAN MALIGNANT NEOPLASM: ICD-10-CM

## 2017-04-13 RX ORDER — TRANEXAMIC ACID 650 MG/1
650 TABLET ORAL
Qty: 30 | Refills: 2 | Status: COMPLETED | COMMUNITY
Start: 2017-02-23 | End: 2017-02-23

## 2017-05-03 ENCOUNTER — APPOINTMENT (OUTPATIENT)
Dept: OBGYN | Facility: CLINIC | Age: 47
End: 2017-05-03

## 2018-08-20 PROBLEM — N92.0 EXCESSIVE AND FREQUENT MENSTRUATION WITH REGULAR CYCLE: Chronic | Status: ACTIVE | Noted: 2017-03-23

## 2018-08-20 PROBLEM — D25.9 LEIOMYOMA OF UTERUS, UNSPECIFIED: Chronic | Status: ACTIVE | Noted: 2017-03-23

## 2018-08-20 PROBLEM — I82.409 ACUTE EMBOLISM AND THROMBOSIS OF UNSPECIFIED DEEP VEINS OF UNSPECIFIED LOWER EXTREMITY: Chronic | Status: ACTIVE | Noted: 2017-03-23

## 2018-08-20 PROBLEM — D64.9 ANEMIA, UNSPECIFIED: Chronic | Status: ACTIVE | Noted: 2017-03-23

## 2018-10-08 ENCOUNTER — APPOINTMENT (OUTPATIENT)
Dept: OBGYN | Facility: CLINIC | Age: 48
End: 2018-10-08
Payer: COMMERCIAL

## 2018-10-08 VITALS
OXYGEN SATURATION: 98 % | SYSTOLIC BLOOD PRESSURE: 104 MMHG | DIASTOLIC BLOOD PRESSURE: 64 MMHG | BODY MASS INDEX: 22.15 KG/M2 | HEIGHT: 63 IN | RESPIRATION RATE: 14 BRPM | HEART RATE: 68 BPM | WEIGHT: 125 LBS

## 2018-10-08 DIAGNOSIS — N76.0 ACUTE VAGINITIS: ICD-10-CM

## 2018-10-08 DIAGNOSIS — B96.89 ACUTE VAGINITIS: ICD-10-CM

## 2018-10-08 PROCEDURE — XXXXX: CPT

## 2018-10-18 LAB
CANDIDA VAG CYTO: NOT DETECTED
G VAGINALIS+PREV SP MTYP VAG QL MICRO: DETECTED
T VAGINALIS VAG QL WET PREP: NOT DETECTED

## 2018-10-19 LAB
CYTOLOGY CVX/VAG DOC THIN PREP: NORMAL
HPV HIGH+LOW RISK DNA PNL CVX: NOT DETECTED

## 2019-01-05 ENCOUNTER — INPATIENT (INPATIENT)
Facility: HOSPITAL | Age: 49
LOS: 0 days | Discharge: ANOTHER HOSPITAL | DRG: 390 | End: 2019-01-06
Attending: SURGERY | Admitting: SURGERY
Payer: COMMERCIAL

## 2019-01-05 ENCOUNTER — EMERGENCY (EMERGENCY)
Facility: HOSPITAL | Age: 49
LOS: 1 days | End: 2019-01-05
Admitting: SURGERY
Payer: COMMERCIAL

## 2019-01-05 VITALS
RESPIRATION RATE: 16 BRPM | TEMPERATURE: 98 F | DIASTOLIC BLOOD PRESSURE: 83 MMHG | WEIGHT: 119.93 LBS | OXYGEN SATURATION: 96 % | SYSTOLIC BLOOD PRESSURE: 119 MMHG | HEART RATE: 80 BPM | HEIGHT: 63 IN

## 2019-01-05 DIAGNOSIS — Z98.890 OTHER SPECIFIED POSTPROCEDURAL STATES: Chronic | ICD-10-CM

## 2019-01-05 LAB
ALBUMIN SERPL ELPH-MCNC: 4.2 G/DL — SIGNIFICANT CHANGE UP (ref 3.3–5)
ALP SERPL-CCNC: 75 U/L — SIGNIFICANT CHANGE UP (ref 30–120)
ALT FLD-CCNC: 35 U/L DA — SIGNIFICANT CHANGE UP (ref 10–60)
ANION GAP SERPL CALC-SCNC: 11 MMOL/L — SIGNIFICANT CHANGE UP (ref 5–17)
APTT BLD: 34.4 SEC — SIGNIFICANT CHANGE UP (ref 27.5–36.3)
AST SERPL-CCNC: 22 U/L — SIGNIFICANT CHANGE UP (ref 10–40)
BASOPHILS # BLD AUTO: 0.05 K/UL — SIGNIFICANT CHANGE UP (ref 0–0.2)
BASOPHILS NFR BLD AUTO: 0.2 % — SIGNIFICANT CHANGE UP (ref 0–2)
BILIRUB SERPL-MCNC: 0.8 MG/DL — SIGNIFICANT CHANGE UP (ref 0.2–1.2)
BUN SERPL-MCNC: 21 MG/DL — SIGNIFICANT CHANGE UP (ref 7–23)
CALCIUM SERPL-MCNC: 10.2 MG/DL — SIGNIFICANT CHANGE UP (ref 8.4–10.5)
CHLORIDE SERPL-SCNC: 101 MMOL/L — SIGNIFICANT CHANGE UP (ref 96–108)
CO2 SERPL-SCNC: 29 MMOL/L — SIGNIFICANT CHANGE UP (ref 22–31)
CREAT SERPL-MCNC: 0.72 MG/DL — SIGNIFICANT CHANGE UP (ref 0.5–1.3)
EOSINOPHIL # BLD AUTO: 0.06 K/UL — SIGNIFICANT CHANGE UP (ref 0–0.5)
EOSINOPHIL NFR BLD AUTO: 0.3 % — SIGNIFICANT CHANGE UP (ref 0–6)
GLUCOSE SERPL-MCNC: 163 MG/DL — HIGH (ref 70–99)
HCG SERPL-ACNC: 3 MIU/ML — SIGNIFICANT CHANGE UP
HCT VFR BLD CALC: 47.1 % — HIGH (ref 34.5–45)
HGB BLD-MCNC: 16 G/DL — HIGH (ref 11.5–15.5)
IMM GRANULOCYTES NFR BLD AUTO: 0.4 % — SIGNIFICANT CHANGE UP (ref 0–1.5)
INR BLD: 1.01 RATIO — SIGNIFICANT CHANGE UP (ref 0.88–1.16)
LIDOCAIN IGE QN: 52 U/L — LOW (ref 73–393)
LYMPHOCYTES # BLD AUTO: 1.98 K/UL — SIGNIFICANT CHANGE UP (ref 1–3.3)
LYMPHOCYTES # BLD AUTO: 9.3 % — LOW (ref 13–44)
MCHC RBC-ENTMCNC: 30.5 PG — SIGNIFICANT CHANGE UP (ref 27–34)
MCHC RBC-ENTMCNC: 34 GM/DL — SIGNIFICANT CHANGE UP (ref 32–36)
MCV RBC AUTO: 89.9 FL — SIGNIFICANT CHANGE UP (ref 80–100)
MONOCYTES # BLD AUTO: 0.82 K/UL — SIGNIFICANT CHANGE UP (ref 0–0.9)
MONOCYTES NFR BLD AUTO: 3.8 % — SIGNIFICANT CHANGE UP (ref 2–14)
NEUTROPHILS # BLD AUTO: 18.37 K/UL — HIGH (ref 1.8–7.4)
NEUTROPHILS NFR BLD AUTO: 86 % — HIGH (ref 43–77)
NRBC # BLD: 0 /100 WBCS — SIGNIFICANT CHANGE UP (ref 0–0)
PLATELET # BLD AUTO: 269 K/UL — SIGNIFICANT CHANGE UP (ref 150–400)
POTASSIUM SERPL-MCNC: 3.1 MMOL/L — LOW (ref 3.5–5.3)
POTASSIUM SERPL-SCNC: 3.1 MMOL/L — LOW (ref 3.5–5.3)
PROT SERPL-MCNC: 8.5 G/DL — HIGH (ref 6–8.3)
PROTHROM AB SERPL-ACNC: 11 SEC — SIGNIFICANT CHANGE UP (ref 10–12.9)
RBC # BLD: 5.24 M/UL — HIGH (ref 3.8–5.2)
RBC # FLD: 11.8 % — SIGNIFICANT CHANGE UP (ref 10.3–14.5)
SODIUM SERPL-SCNC: 141 MMOL/L — SIGNIFICANT CHANGE UP (ref 135–145)
WBC # BLD: 21.36 K/UL — HIGH (ref 3.8–10.5)
WBC # FLD AUTO: 21.36 K/UL — HIGH (ref 3.8–10.5)

## 2019-01-05 PROCEDURE — 85610 PROTHROMBIN TIME: CPT

## 2019-01-05 PROCEDURE — 81025 URINE PREGNANCY TEST: CPT

## 2019-01-05 PROCEDURE — 83690 ASSAY OF LIPASE: CPT

## 2019-01-05 PROCEDURE — 85027 COMPLETE CBC AUTOMATED: CPT

## 2019-01-05 PROCEDURE — 80053 COMPREHEN METABOLIC PANEL: CPT

## 2019-01-05 PROCEDURE — 96361 HYDRATE IV INFUSION ADD-ON: CPT

## 2019-01-05 PROCEDURE — 71045 X-RAY EXAM CHEST 1 VIEW: CPT

## 2019-01-05 PROCEDURE — 84702 CHORIONIC GONADOTROPIN TEST: CPT

## 2019-01-05 PROCEDURE — 96375 TX/PRO/DX INJ NEW DRUG ADDON: CPT

## 2019-01-05 PROCEDURE — 81001 URINALYSIS AUTO W/SCOPE: CPT

## 2019-01-05 PROCEDURE — 96365 THER/PROPH/DIAG IV INF INIT: CPT

## 2019-01-05 PROCEDURE — 99285 EMERGENCY DEPT VISIT HI MDM: CPT | Mod: 25

## 2019-01-05 PROCEDURE — 85730 THROMBOPLASTIN TIME PARTIAL: CPT

## 2019-01-05 PROCEDURE — 74019 RADEX ABDOMEN 2 VIEWS: CPT

## 2019-01-05 PROCEDURE — 74177 CT ABD & PELVIS W/CONTRAST: CPT

## 2019-01-05 PROCEDURE — 99285 EMERGENCY DEPT VISIT HI MDM: CPT

## 2019-01-05 PROCEDURE — 36415 COLL VENOUS BLD VENIPUNCTURE: CPT

## 2019-01-05 PROCEDURE — 93005 ELECTROCARDIOGRAM TRACING: CPT

## 2019-01-05 RX ORDER — ONDANSETRON 8 MG/1
4 TABLET, FILM COATED ORAL ONCE
Qty: 0 | Refills: 0 | Status: COMPLETED | OUTPATIENT
Start: 2019-01-05 | End: 2019-01-05

## 2019-01-05 RX ORDER — PANTOPRAZOLE SODIUM 20 MG/1
40 TABLET, DELAYED RELEASE ORAL ONCE
Qty: 0 | Refills: 0 | Status: COMPLETED | OUTPATIENT
Start: 2019-01-05 | End: 2019-01-05

## 2019-01-05 RX ORDER — MORPHINE SULFATE 50 MG/1
4 CAPSULE, EXTENDED RELEASE ORAL ONCE
Qty: 0 | Refills: 0 | Status: DISCONTINUED | OUTPATIENT
Start: 2019-01-05 | End: 2019-01-05

## 2019-01-05 RX ORDER — SODIUM CHLORIDE 9 MG/ML
1000 INJECTION INTRAMUSCULAR; INTRAVENOUS; SUBCUTANEOUS ONCE
Qty: 0 | Refills: 0 | Status: COMPLETED | OUTPATIENT
Start: 2019-01-05 | End: 2019-01-05

## 2019-01-05 RX ORDER — SODIUM CHLORIDE 9 MG/ML
3 INJECTION INTRAMUSCULAR; INTRAVENOUS; SUBCUTANEOUS ONCE
Qty: 0 | Refills: 0 | Status: COMPLETED | OUTPATIENT
Start: 2019-01-05 | End: 2019-01-05

## 2019-01-05 RX ADMIN — PANTOPRAZOLE SODIUM 40 MILLIGRAM(S): 20 TABLET, DELAYED RELEASE ORAL at 23:05

## 2019-01-05 RX ADMIN — MORPHINE SULFATE 4 MILLIGRAM(S): 50 CAPSULE, EXTENDED RELEASE ORAL at 23:05

## 2019-01-05 RX ADMIN — ONDANSETRON 4 MILLIGRAM(S): 8 TABLET, FILM COATED ORAL at 23:05

## 2019-01-05 RX ADMIN — SODIUM CHLORIDE 3 MILLILITER(S): 9 INJECTION INTRAMUSCULAR; INTRAVENOUS; SUBCUTANEOUS at 23:05

## 2019-01-05 RX ADMIN — SODIUM CHLORIDE 1000 MILLILITER(S): 9 INJECTION INTRAMUSCULAR; INTRAVENOUS; SUBCUTANEOUS at 23:04

## 2019-01-05 NOTE — ED PROVIDER NOTE - NS_ ATTENDINGSCRIBEDETAILS _ED_A_ED_FT
Oral Pereira MD - The scribe's documentation has been prepared under my direction and personally reviewed by me in its entirety. I confirm that the note above accurately reflects all work, treatment, procedures, and medical decision making performed by me.

## 2019-01-05 NOTE — ED PROVIDER NOTE - GASTROINTESTINAL, MLM
old midline scar. TTP upper abdomen, maximum in epigastrium. No rebound or guarding. + bowel sounds.

## 2019-01-05 NOTE — ED PROVIDER NOTE - OBJECTIVE STATEMENT
47 y/o F pt w/ PMHx anemia, DVT, fibroids, menorrhagia and PSHx "cyst removal from stomach" (8-10 years ago) presents to the ED c/o diffuse sharp abd pain x 3 hours with vomiting. No aggravating or relieving factors. Pt denies diarrhea, fever or any other complaints at this time.

## 2019-01-05 NOTE — ED PROVIDER NOTE - PROGRESS NOTE DETAILS
Seen by Dr. Buchanan. He will admit and get GI/Med involved. Dr. Buchanan made arrangements to transfer to [facility].

## 2019-01-06 VITALS
HEART RATE: 72 BPM | TEMPERATURE: 99 F | OXYGEN SATURATION: 99 % | RESPIRATION RATE: 16 BRPM | DIASTOLIC BLOOD PRESSURE: 77 MMHG | SYSTOLIC BLOOD PRESSURE: 117 MMHG

## 2019-01-06 DIAGNOSIS — R10.9 UNSPECIFIED ABDOMINAL PAIN: ICD-10-CM

## 2019-01-06 LAB
APPEARANCE UR: CLEAR — SIGNIFICANT CHANGE UP
BACTERIA # UR AUTO: ABNORMAL
BILIRUB UR-MCNC: NEGATIVE — SIGNIFICANT CHANGE UP
COLOR SPEC: YELLOW — SIGNIFICANT CHANGE UP
DIFF PNL FLD: ABNORMAL
EPI CELLS # UR: SIGNIFICANT CHANGE UP
GLUCOSE UR QL: NEGATIVE MG/DL — SIGNIFICANT CHANGE UP
HCG UR QL: NEGATIVE — SIGNIFICANT CHANGE UP
KETONES UR-MCNC: ABNORMAL
LEUKOCYTE ESTERASE UR-ACNC: ABNORMAL
NITRITE UR-MCNC: NEGATIVE — SIGNIFICANT CHANGE UP
PH UR: 6 — SIGNIFICANT CHANGE UP (ref 5–8)
PROT UR-MCNC: 30 MG/DL
RBC CASTS # UR COMP ASSIST: SIGNIFICANT CHANGE UP /HPF (ref 0–4)
SP GR SPEC: 1.02 — SIGNIFICANT CHANGE UP (ref 1.01–1.02)
UROBILINOGEN FLD QL: 4 MG/DL
WBC UR QL: SIGNIFICANT CHANGE UP

## 2019-01-06 PROCEDURE — 93010 ELECTROCARDIOGRAM REPORT: CPT

## 2019-01-06 PROCEDURE — 74177 CT ABD & PELVIS W/CONTRAST: CPT | Mod: 26

## 2019-01-06 PROCEDURE — 71045 X-RAY EXAM CHEST 1 VIEW: CPT | Mod: 26

## 2019-01-06 PROCEDURE — 74019 RADEX ABDOMEN 2 VIEWS: CPT | Mod: 26

## 2019-01-06 PROCEDURE — 99285 EMERGENCY DEPT VISIT HI MDM: CPT

## 2019-01-06 RX ORDER — ONDANSETRON 8 MG/1
4 TABLET, FILM COATED ORAL ONCE
Qty: 0 | Refills: 0 | Status: COMPLETED | OUTPATIENT
Start: 2019-01-06 | End: 2019-01-06

## 2019-01-06 RX ORDER — POTASSIUM CHLORIDE 20 MEQ
10 PACKET (EA) ORAL ONCE
Qty: 0 | Refills: 0 | Status: COMPLETED | OUTPATIENT
Start: 2019-01-06 | End: 2019-01-06

## 2019-01-06 RX ADMIN — Medication 10 MILLIEQUIVALENT(S): at 05:50

## 2019-01-06 RX ADMIN — Medication 100 MILLIEQUIVALENT(S): at 04:50

## 2019-01-06 RX ADMIN — ONDANSETRON 4 MILLIGRAM(S): 8 TABLET, FILM COATED ORAL at 05:48

## 2019-01-06 RX ADMIN — SODIUM CHLORIDE 1000 MILLILITER(S): 9 INJECTION INTRAMUSCULAR; INTRAVENOUS; SUBCUTANEOUS at 00:05

## 2019-01-06 NOTE — CONSULT NOTE ADULT - SUBJECTIVE AND OBJECTIVE BOX
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  Pt states for last month has been having some epigastric discomfort, comes and goes. LAst night she had severe pain in epigastric area with vomiting prompting trip to ER. PT denies any previous episodes.  No fver/chills. LAST BM yesterday    REVIEW OF SYSTEMS:    CONSTITUTIONAL: No weakness, fatigue, malaise, fevers or chills, no weight change, appetite change  EYES: No visual changes; No double vision,  No vertigo, eye pain  Ears: no otalgia, no otorhea, no hearing loss, tinnitus  Nose: no epistaxis, rhinorrhea, sinus pressure  Throat: no throat pain, no oral lesions  NECK: No pain or stiffness  RESPIRATORY: No cough (productive or dry), wheezing, hemoptysis; No shortness of breath  CARDIOVASCULAR: No chest pain or palpitations,    GASTROINTESTINAL: as above  NEUROLOGICAL: No numbness or weakness, headache, memory loss,   SKIN: No pruritis, rashes, lesions or new moles  Psych: No anxiety, sadness, insomnia,    Endocrine: No Heat or Cold intolerance, polydipsia, polyphagia  Heme/Lymph: no LN enlargement, no easy bruising or bleeding     PAST MEDICAL & SURGICAL HISTORY:  DVT (deep venous thrombosis): in Right LE  Fibroids  Menorrhagia  Anemia  H/O vascular surgery: S/P evacuation of blood clot in right leg 11/2016  H/O abdominal surgery: 2008    Home Medications:  acetaminophen 325 mg oral tablet: 2 tab(s) orally every 6 hours, As needed, Headache/ Mild Pain (1-3) (18 Apr 2017 09:48)  enoxaparin: 40 milligram(s) subcutaneous once a day (18 Apr 2017 09:48)    Allergies    No Known Allergies    Intolerances    SH- neg x3  FH- neg    .  VITAL SIGNS:  T(C): 37.2 (01-06-19 @ 08:49), Max: 37.2 (01-06-19 @ 08:49)  T(F): 99 (01-06-19 @ 08:49), Max: 99 (01-06-19 @ 08:49)  HR: 73 (01-06-19 @ 08:49) (67 - 80)  BP: 120/79 (01-06-19 @ 08:49) (106/76 - 120/79)  BP(mean): --  RR: 16 (01-06-19 @ 08:49) (16 - 20)  SpO2: 99% (01-06-19 @ 08:49) (95% - 99%)  Wt(kg): --    PHYSICAL EXAM:    Constitutional:  NAD, resting comfortably in bed  Head: NC/AT  Eyes: PERRL b/l  ENT: MMM  Neck: supple; no JVD or thyromegaly  Respiratory: CTA B/L   Cardiac: +S1/S2; RRR   Gastrointestinal: soft, ND, slight epigastric tenderness  Neurologic: AAOx3; CNII-XII grossly intact; no focal deficits  Psychiatric: affect and characteristics of appearance, verbalizations, behaviors are appropriate                          16.0   21.36 )-----------( 269      ( 05 Jan 2019 23:19 )             47.1   01-05    141  |  101  |  21  ----------------------------<  163<H>  3.1<L>   |  29  |  0.72    Ca    10.2      05 Jan 2019 23:19    TPro  8.5<H>  /  Alb  4.2  /  TBili  0.8  /  DBili  x   /  AST  22  /  ALT  35  /  AlkPhos  75  01-05

## 2019-01-06 NOTE — CONSULT NOTE ADULT - ASSESSMENT
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 CT concerning for RUQ inflammation/abscess. with also SBO.      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 CT concerning for RUQ inflammation/abscess. with also SBO.  PT continued care is out of my scope of practice.  D/W covering surgeon at [facility] who agreeable to transfer pt. Coordinated with transfer center and signed out to accepting surgical team.  Spent over 1 hr of time with pt workup and coordination of pt care      transfer to [place] for continuity of care

## 2019-01-09 PROCEDURE — 84702 CHORIONIC GONADOTROPIN TEST: CPT

## 2019-01-09 PROCEDURE — 93005 ELECTROCARDIOGRAM TRACING: CPT

## 2019-01-09 PROCEDURE — 81025 URINE PREGNANCY TEST: CPT

## 2019-01-09 PROCEDURE — 80053 COMPREHEN METABOLIC PANEL: CPT

## 2019-01-09 PROCEDURE — 74177 CT ABD & PELVIS W/CONTRAST: CPT

## 2019-01-09 PROCEDURE — 74019 RADEX ABDOMEN 2 VIEWS: CPT

## 2019-01-09 PROCEDURE — 81001 URINALYSIS AUTO W/SCOPE: CPT

## 2019-01-09 PROCEDURE — 85610 PROTHROMBIN TIME: CPT

## 2019-01-09 PROCEDURE — 36415 COLL VENOUS BLD VENIPUNCTURE: CPT

## 2019-01-09 PROCEDURE — 85027 COMPLETE CBC AUTOMATED: CPT

## 2019-01-09 PROCEDURE — 83690 ASSAY OF LIPASE: CPT

## 2019-01-09 PROCEDURE — 85730 THROMBOPLASTIN TIME PARTIAL: CPT

## 2019-01-09 PROCEDURE — 96375 TX/PRO/DX INJ NEW DRUG ADDON: CPT

## 2019-01-09 PROCEDURE — 71045 X-RAY EXAM CHEST 1 VIEW: CPT

## 2019-01-09 PROCEDURE — 99285 EMERGENCY DEPT VISIT HI MDM: CPT | Mod: 25

## 2019-01-09 PROCEDURE — 96374 THER/PROPH/DIAG INJ IV PUSH: CPT

## 2019-01-15 DIAGNOSIS — Z86.718 PERSONAL HISTORY OF OTHER VENOUS THROMBOSIS AND EMBOLISM: ICD-10-CM

## 2019-01-15 DIAGNOSIS — K56.609 UNSPECIFIED INTESTINAL OBSTRUCTION, UNSPECIFIED AS TO PARTIAL VERSUS COMPLETE OBSTRUCTION: ICD-10-CM

## 2019-01-15 DIAGNOSIS — Z98.890 OTHER SPECIFIED POSTPROCEDURAL STATES: ICD-10-CM

## 2019-01-15 DIAGNOSIS — D25.9 LEIOMYOMA OF UTERUS, UNSPECIFIED: ICD-10-CM

## 2019-10-28 ENCOUNTER — APPOINTMENT (OUTPATIENT)
Dept: OBGYN | Facility: CLINIC | Age: 49
End: 2019-10-28
Payer: COMMERCIAL

## 2019-10-28 VITALS — WEIGHT: 127 LBS | HEIGHT: 63 IN | BODY MASS INDEX: 22.5 KG/M2

## 2019-10-28 DIAGNOSIS — Z01.419 ENCOUNTER FOR GYNECOLOGICAL EXAMINATION (GENERAL) (ROUTINE) W/OUT ABNORMAL FINDINGS: ICD-10-CM

## 2019-10-28 PROCEDURE — 99396 PREV VISIT EST AGE 40-64: CPT

## 2019-10-28 NOTE — CHIEF COMPLAINT
[Annual Visit] : annual visit [FreeTextEntry1] : Patient is a 49-year-old female presents for a routine annual gynecologic examination. Patient has no complaints. Patient's last mammogram was several years ago  shannan is status post hysterectomy in 2017

## 2019-10-28 NOTE — PHYSICAL EXAM
[Awake] : awake [Alert] : alert [Acute Distress] : no acute distress [Mass] : no breast mass [Nipple Discharge] : no nipple discharge [Axillary LAD] : no axillary lymphadenopathy [Soft] : soft [Tender] : non tender [Oriented x3] : oriented to person, place, and time [Normal] : cervix [No Bleeding] : there was no active vaginal bleeding [Pap Obtained] : a Pap smear was performed [Absent] : absent [Uterine Adnexae] : were not tender and not enlarged [No Tenderness] : no rectal tenderness [Occult Blood] : occult blood test from digital rectal exam was negative [Nl Sphincter Tone] : normal sphincter tone

## 2019-10-29 LAB — HPV HIGH+LOW RISK DNA PNL CVX: NOT DETECTED

## 2020-09-12 ENCOUNTER — TRANSCRIPTION ENCOUNTER (OUTPATIENT)
Age: 50
End: 2020-09-12

## 2020-12-15 PROBLEM — Z12.11 ENCOUNTER FOR SCREENING COLONOSCOPY: Status: RESOLVED | Noted: 2017-02-03 | Resolved: 2020-12-15

## 2020-12-16 PROBLEM — N76.0 BACTERIAL VAGINOSIS: Status: RESOLVED | Noted: 2018-10-18 | Resolved: 2020-12-16

## 2021-02-08 ENCOUNTER — APPOINTMENT (OUTPATIENT)
Dept: OBGYN | Facility: CLINIC | Age: 51
End: 2021-02-08

## 2021-06-11 ENCOUNTER — APPOINTMENT (OUTPATIENT)
Dept: OTOLARYNGOLOGY | Facility: CLINIC | Age: 51
End: 2021-06-11
Payer: COMMERCIAL

## 2021-06-11 VITALS
HEART RATE: 64 BPM | DIASTOLIC BLOOD PRESSURE: 78 MMHG | TEMPERATURE: 97.7 F | BODY MASS INDEX: 21.26 KG/M2 | SYSTOLIC BLOOD PRESSURE: 113 MMHG | WEIGHT: 120 LBS | HEIGHT: 63 IN

## 2021-06-11 PROCEDURE — 99072 ADDL SUPL MATRL&STAF TM PHE: CPT

## 2021-06-11 PROCEDURE — 31231 NASAL ENDOSCOPY DX: CPT

## 2021-06-11 PROCEDURE — 99203 OFFICE O/P NEW LOW 30 MIN: CPT | Mod: 25

## 2021-06-11 NOTE — HISTORY OF PRESENT ILLNESS
[de-identified] : 49 y/o F c/o b/l intermittent nasal itching, with some nasal tenderness x 10 years. Pt states sxs are very random will occur randomly, not at a particular time. \par +nasal crusting with bleeding occ \par not been tested for allergies, but doesn’t feel like she has any. \par pt has tried Flonase since a relative had an extra bottle and gave her mild relief. \par pt denies nasal congestion, runny nose, sinus pressures, sinus infections \par no eczema hx, no masses or lesions she felt

## 2021-06-11 NOTE — PROCEDURE
[FreeTextEntry6] : Procedure performed: Nasal Endoscopy- Diagnostic\par Pre-op indication(s): nasal congestion\par Post-op indication(s): nasal congestion \par Verbal and/or written consent obtained from patient\par Anterior rhinoscopy insufficient to account for symptoms\par Scope #: 3,  flexible fiber optic telescope \par The scope was introduced in the nasal passage between the middle and inferior turbinates to exam the inferior portion of the middle meatus and the fontanelle, as well as the maxillary ostia.  Next, the scope was passed medically and posteriorly to the middle turbinates to examine the sphenoethmoid recess and the superior turbinate region.\par Upon visualization the finders are as follows:\par Nasal Septum: sigmoidal septal deviation, narrow nasal valve \par Bilateral - Mucosa: boggy turbinates, Mucous: scant, Polyp: not seen, Inferior Turbinate:  +dryness at anterior tubr R. Middle Turbinate: b/l BITH, Superior Turbinate: normal, Inferior Meatus: narrow, Middle Meatus: narrow, Super Meatus:normal, Sphenoethmoidal Recess: clear\par

## 2021-06-11 NOTE — CONSULT LETTER
[Please see my note below.] : Please see my note below. [FreeTextEntry1] : Dear Dr. KEVIN ANDUJAR \par I had the pleasure of evaluating your patient PRACHI AGUAYO, thank you for allowing us to participate in their care. please see full note detailing our visit below.\par If you have any questions, please do not hesitate to call me and I would be happy to discuss further. \par \par Michel Leiva M.D.\par Attending Physician,  \par Department of Otolaryngology - Head and Neck Surgery\par [facility] \par Office: [phone]\par Fax: [phone]\par \par

## 2021-06-11 NOTE — ASSESSMENT
[FreeTextEntry1] : 49 y/o F who presents with nasal itching and tenderness, on exam b/l BITH, DNS. Also on exam some mild nasal dryness at anterior inf turb R. No masses or lesions seen\par - hydrocortisone ointment x 7 days \par - ponaris oil

## 2021-06-11 NOTE — END OF VISIT
[FreeTextEntry3] : I personally saw and examined PRACHI AGUAYO in detail. I spoke to MYCHAL Maldonado regarding the assessment and plan of care.  I preformed the procedures and I reviewed the above assessment and plan of care, and agree. I have made changes in changes in the body of the note where appropriate.\par \par

## 2021-10-25 ENCOUNTER — APPOINTMENT (OUTPATIENT)
Dept: OBGYN | Facility: CLINIC | Age: 51
End: 2021-10-25
Payer: COMMERCIAL

## 2021-10-25 VITALS
HEIGHT: 63 IN | SYSTOLIC BLOOD PRESSURE: 103 MMHG | BODY MASS INDEX: 22.15 KG/M2 | WEIGHT: 125 LBS | DIASTOLIC BLOOD PRESSURE: 80 MMHG

## 2021-10-25 DIAGNOSIS — Z12.4 ENCOUNTER FOR SCREENING FOR MALIGNANT NEOPLASM OF CERVIX: ICD-10-CM

## 2021-10-25 DIAGNOSIS — Z98.890 OTHER SPECIFIED POSTPROCEDURAL STATES: ICD-10-CM

## 2021-10-25 DIAGNOSIS — Z01.411 ENCOUNTER FOR GYNECOLOGICAL EXAMINATION (GENERAL) (ROUTINE) WITH ABNORMAL FINDINGS: ICD-10-CM

## 2021-10-25 DIAGNOSIS — N89.8 OTHER SPECIFIED NONINFLAMMATORY DISORDERS OF VAGINA: ICD-10-CM

## 2021-10-25 DIAGNOSIS — Z12.39 ENCOUNTER FOR OTHER SCREENING FOR MALIGNANT NEOPLASM OF BREAST: ICD-10-CM

## 2021-10-25 DIAGNOSIS — Z09 ENCOUNTER FOR FOLLOW-UP EXAMINATION AFTER COMPLETED TREATMENT FOR CONDITIONS OTHER THAN MALIGNANT NEOPLASM: ICD-10-CM

## 2021-10-25 PROCEDURE — 99396 PREV VISIT EST AGE 40-64: CPT

## 2021-10-25 PROCEDURE — 99212 OFFICE O/P EST SF 10 MIN: CPT | Mod: 25

## 2021-10-25 RX ORDER — METFORMIN HYDROCHLORIDE 500 MG/1
500 TABLET, COATED ORAL
Qty: 30 | Refills: 0 | Status: DISCONTINUED | COMMUNITY
Start: 2021-04-01

## 2021-10-25 RX ORDER — DOCOSAHEXAENOIC ACID 300 MG
50 MCG CAPSULE ORAL
Qty: 90 | Refills: 0 | Status: ACTIVE | COMMUNITY
Start: 2021-08-30

## 2021-10-25 RX ORDER — SODIUM SULFATE, MAGNESIUM SULFATE, AND POTASSIUM CHLORIDE 17.75; 2.7; 2.25 G/1; G/1; G/1
1479-225-188 TABLET ORAL
Qty: 24 | Refills: 0 | Status: DISCONTINUED | COMMUNITY
Start: 2021-10-18

## 2021-10-25 RX ORDER — METRONIDAZOLE 7.5 MG/G
0.75 GEL VAGINAL
Qty: 1 | Refills: 0 | Status: DISCONTINUED | COMMUNITY
Start: 2018-10-18 | End: 2021-10-25

## 2021-10-25 NOTE — HISTORY OF PRESENT ILLNESS
[Currently Active] : currently active [Men] : men [TextBox_4] : Soledad is a 50 y/o  who presents today for an annual exam. she has noticed an increase in vaginal discharge.\par \par She states she had a screening mammogram at [facility] in [place] about 6 months ago.\par \par She had a colonoscopy a week ago.\par \par She had a hysterectomy in .   [Mammogramdate] : 6 months ago [ColonoscopyDate] : 10/2021 [FreeTextEntry1] : 2008

## 2021-10-25 NOTE — DISCUSSION/SUMMARY
[FreeTextEntry1] : 1) affirm obtained to r/o infection. Pt advised not to insert soap into vagina\par 2) pap performed\par 3) will obtain mammo from [facility]\par \par Return top office in oneyear, sooner prn

## 2021-10-26 ENCOUNTER — NON-APPOINTMENT (OUTPATIENT)
Age: 51
End: 2021-10-26

## 2021-10-26 LAB
CANDIDA VAG CYTO: NOT DETECTED
G VAGINALIS+PREV SP MTYP VAG QL MICRO: DETECTED
HPV HIGH+LOW RISK DNA PNL CVX: NOT DETECTED
T VAGINALIS VAG QL WET PREP: NOT DETECTED

## 2022-12-05 ENCOUNTER — APPOINTMENT (OUTPATIENT)
Dept: OBGYN | Facility: CLINIC | Age: 52
End: 2022-12-05

## 2022-12-05 VITALS
HEIGHT: 63 IN | DIASTOLIC BLOOD PRESSURE: 62 MMHG | WEIGHT: 120 LBS | BODY MASS INDEX: 21.26 KG/M2 | SYSTOLIC BLOOD PRESSURE: 100 MMHG

## 2022-12-05 DIAGNOSIS — N95.1 MENOPAUSAL AND FEMALE CLIMACTERIC STATES: ICD-10-CM

## 2022-12-05 DIAGNOSIS — Z01.419 ENCOUNTER FOR GYNECOLOGICAL EXAMINATION (GENERAL) (ROUTINE) W/OUT ABNORMAL FINDINGS: ICD-10-CM

## 2022-12-05 DIAGNOSIS — N95.2 POSTMENOPAUSAL ATROPHIC VAGINITIS: ICD-10-CM

## 2022-12-05 PROCEDURE — 99396 PREV VISIT EST AGE 40-64: CPT

## 2022-12-05 PROCEDURE — 82270 OCCULT BLOOD FECES: CPT

## 2022-12-05 NOTE — HISTORY OF PRESENT ILLNESS
[FreeTextEntry1] : Patient is a 52-year-old female who presents for routine annual gynecologic examination.  Patient has no complaints.  Patient's last mammogram was November 2022.  Patient does have occasional episodes of vaginal dryness.

## 2022-12-06 LAB
C TRACH RRNA SPEC QL NAA+PROBE: NOT DETECTED
HPV HIGH+LOW RISK DNA PNL CVX: NOT DETECTED
N GONORRHOEA RRNA SPEC QL NAA+PROBE: NOT DETECTED
SOURCE TP AMPLIFICATION: NORMAL

## 2022-12-07 LAB
CANDIDA VAG CYTO: NOT DETECTED
G VAGINALIS+PREV SP MTYP VAG QL MICRO: NOT DETECTED
T VAGINALIS VAG QL WET PREP: NOT DETECTED

## 2022-12-09 ENCOUNTER — NON-APPOINTMENT (OUTPATIENT)
Age: 52
End: 2022-12-09

## 2022-12-09 LAB — CYTOLOGY CVX/VAG DOC THIN PREP: ABNORMAL

## 2022-12-20 RX ORDER — ESTRADIOL 0.1 MG/G
0.1 CREAM VAGINAL
Qty: 1 | Refills: 3 | Status: DISCONTINUED | COMMUNITY
Start: 2022-12-05 | End: 2022-12-20

## 2022-12-20 RX ORDER — METRONIDAZOLE 7.5 MG/G
0.75 GEL VAGINAL
Qty: 1 | Refills: 0 | Status: DISCONTINUED | COMMUNITY
Start: 2021-10-26 | End: 2022-12-20

## 2022-12-21 ENCOUNTER — APPOINTMENT (OUTPATIENT)
Dept: CARDIOLOGY | Facility: CLINIC | Age: 52
End: 2022-12-21

## 2022-12-21 ENCOUNTER — NON-APPOINTMENT (OUTPATIENT)
Age: 52
End: 2022-12-21

## 2022-12-21 VITALS
SYSTOLIC BLOOD PRESSURE: 114 MMHG | WEIGHT: 124 LBS | DIASTOLIC BLOOD PRESSURE: 82 MMHG | OXYGEN SATURATION: 97 % | HEIGHT: 63 IN | BODY MASS INDEX: 21.97 KG/M2 | HEART RATE: 72 BPM

## 2022-12-21 PROCEDURE — 93000 ELECTROCARDIOGRAM COMPLETE: CPT

## 2022-12-21 PROCEDURE — 99203 OFFICE O/P NEW LOW 30 MIN: CPT | Mod: 25

## 2022-12-21 NOTE — PHYSICAL EXAM
[Normal S1, S2] : normal S1, S2 [Clear Lung Fields] : clear lung fields [Normal Bowel Sounds] : normal bowel sounds [Normal Gait] : normal gait [No Edema] : no edema [No Rash] : no rash [Moves all extremities] : moves all extremities [Normal Speech] : normal speech [Alert and Oriented] : alert and oriented [de-identified] :  appears her stated age and well, normal body mass index [de-identified] :  extraocular was intact [de-identified] :  wearing a facemask [de-identified] :  no JVD or carotid bruit

## 2022-12-21 NOTE — DISCUSSION/SUMMARY
[FreeTextEntry1] : \par Shortness of breath: Recent onset of exertional dyspnea in the absence of previous heart disease and normal electrocardiogram.   I recommend echocardiogram to exclude the presence of structural heart abnormalities given her shortness of breath–I will contact her by telephone to discuss results when available.  Arm soreness likely of a musculoskeletal etiology and related to physical work;  no angina.\par \par *   I gave her the name of a local pulmonologist and primary care physician–she would like to establish care with a new internist closer to her home.

## 2022-12-21 NOTE — REVIEW OF SYSTEMS
[SOB] : shortness of breath [Dyspnea on exertion] : dyspnea during exertion [Wheezing] : no wheezing [Negative] : Heme/Lymph [FreeTextEntry9] :   Bilateral arm soreness and stiffness

## 2023-02-01 ENCOUNTER — APPOINTMENT (OUTPATIENT)
Dept: CARDIOLOGY | Facility: CLINIC | Age: 53
End: 2023-02-01
Payer: COMMERCIAL

## 2023-02-01 PROCEDURE — 93306 TTE W/DOPPLER COMPLETE: CPT

## 2023-02-03 DIAGNOSIS — R87.612 LOW GRADE SQUAMOUS INTRAEPITHELIAL LESION ON CYTOLOGIC SMEAR OF CERVIX (LGSIL): ICD-10-CM

## 2023-02-06 ENCOUNTER — APPOINTMENT (OUTPATIENT)
Dept: OBGYN | Facility: CLINIC | Age: 53
End: 2023-02-06
Payer: COMMERCIAL

## 2023-02-06 ENCOUNTER — LABORATORY RESULT (OUTPATIENT)
Age: 53
End: 2023-02-06

## 2023-02-06 VITALS — HEIGHT: 64 IN | WEIGHT: 124 LBS | BODY MASS INDEX: 21.17 KG/M2

## 2023-02-06 VITALS — DIASTOLIC BLOOD PRESSURE: 70 MMHG | SYSTOLIC BLOOD PRESSURE: 108 MMHG

## 2023-02-06 DIAGNOSIS — R87.612 LOW GRADE SQUAMOUS INTRAEPITHELIAL LESION ON CYTOLOGIC SMEAR OF CERVIX (LGSIL): ICD-10-CM

## 2023-02-06 PROCEDURE — 57454 BX/CURETT OF CERVIX W/SCOPE: CPT

## 2023-02-06 NOTE — PROCEDURE
[Colposcopy] : Colposcopy  [Time out performed] : Pre-procedure time out performed.  Patient's name, date of birth and procedure confirmed. [Consent Obtained] : Consent obtained [Risks] : risks [Benefits] : benefits [Alternatives] : alternatives [Patient] : patient [Infection] : infection [Bleeding] : bleeding [Allergic Reaction] : allergic reaction [LGSIL] : LGSIL [No Premedication] : no premedication [Colposcopy Adequate] : colposcopy adequate [Pap Performed] : pap not performed [SCI Fully Visualized] : SCI fully visualized [ECC Performed] : ECC performed [No Abnormalities] : no abnormalities [Hemostasis Obtained] : Hemostasis obtained [Tolerated Well] : the patient tolerated the procedure well [de-identified] : HPV negative [de-identified] : 2 [de-identified] : 12:00 cervix, 9:00 cervix [de-identified] : No obvious lesions further treatment pending test results.  Instructions or precautions reviewed with patient

## 2023-02-15 ENCOUNTER — NON-APPOINTMENT (OUTPATIENT)
Age: 53
End: 2023-02-15

## 2023-07-03 ENCOUNTER — APPOINTMENT (OUTPATIENT)
Dept: OBGYN | Facility: CLINIC | Age: 53
End: 2023-07-03
Payer: COMMERCIAL

## 2023-07-03 DIAGNOSIS — R87.612 LOW GRADE SQUAMOUS INTRAEPITHELIAL LESION ON CYTOLOGIC SMEAR OF CERVIX (LGSIL): ICD-10-CM

## 2023-07-03 PROCEDURE — 99213 OFFICE O/P EST LOW 20 MIN: CPT

## 2023-07-03 NOTE — HISTORY OF PRESENT ILLNESS
[FreeTextEntry1] : Patient is a 53-year-old female who presents for 6-month follow-up Pap.  Patient's previous Pap was LGSIL HPV negative.  Colposcopy biopsies were negative.  Patient has no complaints

## 2023-07-06 LAB — HPV HIGH+LOW RISK DNA PNL CVX: NOT DETECTED

## 2023-07-11 LAB — CYTOLOGY CVX/VAG DOC THIN PREP: ABNORMAL

## 2023-07-28 ENCOUNTER — APPOINTMENT (OUTPATIENT)
Dept: OTOLARYNGOLOGY | Facility: CLINIC | Age: 53
End: 2023-07-28
Payer: COMMERCIAL

## 2023-07-28 VITALS — HEIGHT: 63 IN | BODY MASS INDEX: 22.15 KG/M2 | TEMPERATURE: 96.5 F | WEIGHT: 125 LBS

## 2023-07-28 DIAGNOSIS — L29.8 OTHER PRURITUS: ICD-10-CM

## 2023-07-28 DIAGNOSIS — R09.89 OTHER SPECIFIED SYMPTOMS AND SIGNS INVOLVING THE CIRCULATORY AND RESPIRATORY SYSTEMS: ICD-10-CM

## 2023-07-28 DIAGNOSIS — J34.89 OTHER SPECIFIED DISORDERS OF NOSE AND NASAL SINUSES: ICD-10-CM

## 2023-07-28 DIAGNOSIS — K21.9 GASTRO-ESOPHAGEAL REFLUX DISEASE W/OUT ESOPHAGITIS: ICD-10-CM

## 2023-07-28 PROCEDURE — 31231 NASAL ENDOSCOPY DX: CPT

## 2023-07-28 PROCEDURE — 99214 OFFICE O/P EST MOD 30 MIN: CPT | Mod: 25

## 2023-07-28 RX ORDER — HYDROCORTISONE 25 MG/G
2.5 OINTMENT TOPICAL
Qty: 1 | Refills: 0 | Status: ACTIVE | COMMUNITY
Start: 2021-06-11 | End: 1900-01-01

## 2023-07-28 NOTE — CONSULT LETTER
[Please see my note below.] : Please see my note below. [FreeTextEntry1] : Dear Dr. KEVIN ANDUJAR \par I had the pleasure of evaluating your patient PRACHI AGUAYO, thank you for allowing us to participate in their care. please see full note detailing our visit below.\par If you have any questions, please do not hesitate to call me and I would be happy to discuss further. \par \par Michel Leiva M.D.\par Attending Physician,  \par Department of Otolaryngology - Head and Neck Surgery\par [facility] \par Office: [phone]\par Fax: [phone]\par \par

## 2023-07-28 NOTE — END OF VISIT
[FreeTextEntry3] : I personally saw and examined the patient in detail. I spoke to MYCHAL Moya regarding the assessment and plan of care.  I preformed the procedures and I reviewed the above assessment and plan of care, and agree. I have made changes in changes in the body of the note where appropriate.\par

## 2023-07-28 NOTE — ASSESSMENT
[FreeTextEntry1] : 52 y/o F following up with nasal tenderness and discomfort. On exam, drying anterior septum bilaterally. No masses or lesions seen. going on for 15 years, responds to steroid ointment \par - hydrocortisone ointment x 7 days \par \par \par Patient also with difficulty swallowing. On exam, mild acid reflux. \par will proceed to start lifestyle regiment to reduce overproduction of acid and reduce laryngeal reflux including avoiding caffein, alcohol, eating before bed, spicy and fatty foods, and head elevation at night etc. Handout detailing regiment also given\par -follow up with GI \par \par

## 2023-07-28 NOTE — REVIEW OF SYSTEMS
[Throat Clearing] : throat clearing [As Noted in HPI] : as noted in HPI [Negative] : Allergies [de-identified] : uncomfortable  [de-identified] : hard to swallow

## 2023-07-28 NOTE — HISTORY OF PRESENT ILLNESS
[de-identified] : 49 y/o F c/o b/l intermittent nasal itching, with some nasal tenderness x 10 years. Pt states sxs are very random will occur randomly, not at a particular time. \par +nasal crusting with bleeding occ \par not been tested for allergies, but doesn’t feel like she has any. \par pt has tried Flonase since a relative had an extra bottle and gave her mild relief. \par pt denies nasal congestion, runny nose, sinus pressures, sinus infections \par no eczema hx, no masses or lesions she felt  [FreeTextEntry1] : Patient following up with nasal tenderness and discomfort which waxes and wanes. Uses saline gel which helps, but stated recently has had to use it more often. Denies sinus pressure or sinus infections. States she does not have allergies but does have itchy eyes sometimes. Denies using nasal sprays. Has been using steroid creme which as helped. \par \par Also with difficulty swallowing on occasion for last year. Has sensation of phlegm in throat. Denies hx of acid reflux.\par Worse in the morning

## 2023-07-28 NOTE — REASON FOR VISIT
[Subsequent Evaluation] : a subsequent evaluation for [FreeTextEntry2] : tender nose, phlegm in throat

## 2023-07-28 NOTE — PROCEDURE
[FreeTextEntry6] : Procedure performed: Nasal Endoscopy- Diagnostic\par Pre-op indication(s): nasal congestion\par Post-op indication(s): nasal congestion \par Verbal and/or written consent obtained from patient\par Anterior rhinoscopy insufficient to account for symptoms\par Scope #: 3,  flexible fiber optic telescope \par The scope was introduced in the nasal passage between the middle and inferior turbinates to exam the inferior portion of the middle meatus and the fontanelle, as well as the maxillary ostia.  Next, the scope was passed medically and posteriorly to the middle turbinates to examine the sphenoethmoid recess and the superior turbinate region.\par Upon visualization the finders are as follows:\par Nasal Septum: sigmoidal septal deviation, narrow nasal valve \par Bilateral - Mucosa: boggy turbinates, Mucous: scant, Polyp: not seen, Inferior Turbinate:  +dryness at anterior tubr R. Middle Turbinate: b/l BITH, Superior Turbinate: normal, Inferior Meatus: narrow, Middle Meatus: narrow, Super Meatus:normal, Sphenoethmoidal Recess: clear\par drying anterior septum bilaterally [de-identified] : Procedure performed: laryngeal Endoscopy- Diagnostic\par Pre-op/post op indication: dysphonia\par Verbal and/or written consent obtained from patient, Patient was unable to cooperate with mirror\par Scope #: 3, flexible fiber optic telescope used \par Scope was introduced through the nose passed on the floor of the nose to the nasopharynx and then followed down the soft palate to the lower pharynx. The tongue Base, Larynx, Hypopharynx were examined. Base of tongue was symmetric, vallecular was clear, epiglottis was not deformed, subglottis/ pyriform and posterior pharyngeal walls were clear. No erythema, edema, pooling of secretions, masses or lesions. Airway patent, no foreign body visualized. No glottic/supraglottic edema. True vocal cords, arytenoids, vestibular folds, ventricles, pyriform sinuses, and aryepiglottic folds appear normal bilaterally. Vocal cords mobile with good contact b/l.\par  mild acid reflux

## 2023-08-25 ENCOUNTER — APPOINTMENT (OUTPATIENT)
Dept: OTOLARYNGOLOGY | Facility: CLINIC | Age: 53
End: 2023-08-25

## 2023-10-12 ENCOUNTER — APPOINTMENT (OUTPATIENT)
Dept: INTERNAL MEDICINE | Facility: CLINIC | Age: 53
End: 2023-10-12
Payer: COMMERCIAL

## 2023-10-12 VITALS
DIASTOLIC BLOOD PRESSURE: 81 MMHG | BODY MASS INDEX: 23 KG/M2 | WEIGHT: 125 LBS | TEMPERATURE: 98 F | HEIGHT: 62 IN | SYSTOLIC BLOOD PRESSURE: 116 MMHG | RESPIRATION RATE: 16 BRPM | HEART RATE: 76 BPM | OXYGEN SATURATION: 97 %

## 2023-10-12 DIAGNOSIS — I34.0 NONRHEUMATIC MITRAL (VALVE) INSUFFICIENCY: ICD-10-CM

## 2023-10-12 DIAGNOSIS — R06.02 SHORTNESS OF BREATH: ICD-10-CM

## 2023-10-12 DIAGNOSIS — D64.9 ANEMIA, UNSPECIFIED: ICD-10-CM

## 2023-10-12 DIAGNOSIS — R07.89 OTHER CHEST PAIN: ICD-10-CM

## 2023-10-12 PROCEDURE — 99214 OFFICE O/P EST MOD 30 MIN: CPT | Mod: 25

## 2023-10-12 PROCEDURE — 94729 DIFFUSING CAPACITY: CPT

## 2023-10-12 PROCEDURE — ZZZZZ: CPT

## 2023-10-12 PROCEDURE — 94060 EVALUATION OF WHEEZING: CPT

## 2023-10-12 PROCEDURE — 94727 GAS DIL/WSHOT DETER LNG VOL: CPT

## 2024-01-30 NOTE — HISTORY OF PRESENT ILLNESS
[FreeTextEntry1] : Soledad Corona is a 52-year-old woman with a history of cholangitis, large choledochal cyst (resected in 2008), right leg thrombophlebitis (treated by Dr. Jm Figueroa in 2017) who presents for cardiology consultation because of arm and chest soreness + shortness of breath (she originally had an appointment to see me on November 18, 2022, but rescheduled).  She has no past history of heart disease and generally has been feeling well.  She complains of intermittent dyspnea and describes an episode of shortness of breath while walking quickly in [place] --  no associated chest discomfort but she felt more out of breath than she thought she should have given the amount of activity.  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  She describes mild soreness in both arms that she attributes to long hours of physical work in her restaurant.  She denies typical angina. What Type Of Note Output Would You Prefer (Optional)?: Standard Output Hpi Title: Evaluation of Skin Lesions How Severe Are Your Spot(S)?: mild Have Your Spot(S) Been Treated In The Past?: has not been treated Location: Right distal pretibial region Year Removed: 2021

## 2024-07-17 ENCOUNTER — APPOINTMENT (OUTPATIENT)
Dept: OBGYN | Facility: CLINIC | Age: 54
End: 2024-07-17
Payer: COMMERCIAL

## 2024-07-17 VITALS
HEIGHT: 62 IN | WEIGHT: 130 LBS | SYSTOLIC BLOOD PRESSURE: 102 MMHG | DIASTOLIC BLOOD PRESSURE: 60 MMHG | BODY MASS INDEX: 23.92 KG/M2

## 2024-07-17 DIAGNOSIS — N95.2 POSTMENOPAUSAL ATROPHIC VAGINITIS: ICD-10-CM

## 2024-07-17 DIAGNOSIS — Z90.710 ACQUIRED ABSENCE OF BOTH CERVIX AND UTERUS: ICD-10-CM

## 2024-07-17 DIAGNOSIS — Z01.419 ENCOUNTER FOR GYNECOLOGICAL EXAMINATION (GENERAL) (ROUTINE) W/OUT ABNORMAL FINDINGS: ICD-10-CM

## 2024-07-17 PROCEDURE — 82270 OCCULT BLOOD FECES: CPT

## 2024-07-17 PROCEDURE — 99396 PREV VISIT EST AGE 40-64: CPT | Mod: 25

## 2024-07-18 LAB — HPV HIGH+LOW RISK DNA PNL CVX: NOT DETECTED

## 2024-07-24 LAB — CYTOLOGY CVX/VAG DOC THIN PREP: ABNORMAL

## 2024-08-26 ENCOUNTER — APPOINTMENT (OUTPATIENT)
Dept: OBGYN | Facility: CLINIC | Age: 54
End: 2024-08-26
Payer: COMMERCIAL

## 2024-08-26 VITALS
HEIGHT: 62 IN | DIASTOLIC BLOOD PRESSURE: 70 MMHG | WEIGHT: 130 LBS | SYSTOLIC BLOOD PRESSURE: 100 MMHG | BODY MASS INDEX: 23.92 KG/M2

## 2024-08-26 DIAGNOSIS — N95.2 POSTMENOPAUSAL ATROPHIC VAGINITIS: ICD-10-CM

## 2024-08-26 PROCEDURE — 99214 OFFICE O/P EST MOD 30 MIN: CPT

## 2024-08-26 RX ORDER — ESTRADIOL 0.1 MG/G
0.1 CREAM VAGINAL
Qty: 1 | Refills: 3 | Status: ACTIVE | COMMUNITY
Start: 2024-08-26 | End: 1900-01-01

## 2024-08-26 NOTE — CHIEF COMPLAINT
[Urgent Visit] : Urgent Visit [FreeTextEntry1] : Patient is a 54-year-old female who presents for an urgent visit with complaints of noticing some red spots in the vaginal opening.  Patient has no symptoms or discharge.

## 2024-08-26 NOTE — PHYSICAL EXAM
[Vulvar Atrophy] : vulvar atrophy [Normal] : uterus [Atrophy] : atrophy [No Bleeding] : there was no active vaginal bleeding [Uterine Adnexae] : were not tender and not enlarged [FreeTextEntry4] : Culture done

## 2024-08-28 ENCOUNTER — NON-APPOINTMENT (OUTPATIENT)
Age: 54
End: 2024-08-28

## 2024-08-28 RX ORDER — METRONIDAZOLE 500 MG/1
500 TABLET ORAL TWICE DAILY
Qty: 14 | Refills: 0 | Status: ACTIVE | COMMUNITY
Start: 2024-08-28 | End: 1900-01-01

## 2024-09-02 ENCOUNTER — NON-APPOINTMENT (OUTPATIENT)
Age: 54
End: 2024-09-02

## 2024-12-02 ENCOUNTER — APPOINTMENT (OUTPATIENT)
Dept: OBGYN | Facility: CLINIC | Age: 54
End: 2024-12-02
Payer: COMMERCIAL

## 2024-12-02 DIAGNOSIS — N95.2 POSTMENOPAUSAL ATROPHIC VAGINITIS: ICD-10-CM

## 2024-12-02 DIAGNOSIS — R87.612 LOW GRADE SQUAMOUS INTRAEPITHELIAL LESION ON CYTOLOGIC SMEAR OF CERVIX (LGSIL): ICD-10-CM

## 2024-12-02 PROCEDURE — 99213 OFFICE O/P EST LOW 20 MIN: CPT

## 2024-12-02 PROCEDURE — G2211 COMPLEX E/M VISIT ADD ON: CPT

## 2024-12-03 LAB — HPV HIGH+LOW RISK DNA PNL CVX: NOT DETECTED

## 2024-12-06 LAB — CYTOLOGY CVX/VAG DOC THIN PREP: ABNORMAL

## 2025-03-10 ENCOUNTER — APPOINTMENT (OUTPATIENT)
Dept: OBGYN | Facility: CLINIC | Age: 55
End: 2025-03-10
Payer: COMMERCIAL

## 2025-03-10 VITALS
HEIGHT: 62 IN | BODY MASS INDEX: 24.66 KG/M2 | SYSTOLIC BLOOD PRESSURE: 100 MMHG | WEIGHT: 134 LBS | DIASTOLIC BLOOD PRESSURE: 62 MMHG

## 2025-03-10 DIAGNOSIS — N95.2 POSTMENOPAUSAL ATROPHIC VAGINITIS: ICD-10-CM

## 2025-03-10 DIAGNOSIS — N95.1 MENOPAUSAL AND FEMALE CLIMACTERIC STATES: ICD-10-CM

## 2025-03-10 DIAGNOSIS — N90.89 OTHER SPECIFIED NONINFLAMMATORY DISORDERS OF VULVA AND PERINEUM: ICD-10-CM

## 2025-03-10 PROCEDURE — 99213 OFFICE O/P EST LOW 20 MIN: CPT

## 2025-03-10 RX ORDER — CLOTRIMAZOLE AND BETAMETHASONE DIPROPIONATE 10; .5 MG/G; MG/G
1-0.05 CREAM TOPICAL TWICE DAILY
Qty: 1 | Refills: 1 | Status: ACTIVE | COMMUNITY
Start: 2025-03-10 | End: 1900-01-01

## 2025-07-14 ENCOUNTER — APPOINTMENT (OUTPATIENT)
Dept: OBGYN | Facility: CLINIC | Age: 55
End: 2025-07-14
Payer: COMMERCIAL

## 2025-07-14 ENCOUNTER — NON-APPOINTMENT (OUTPATIENT)
Age: 55
End: 2025-07-14

## 2025-07-14 VITALS
HEIGHT: 62 IN | SYSTOLIC BLOOD PRESSURE: 118 MMHG | WEIGHT: 134 LBS | DIASTOLIC BLOOD PRESSURE: 76 MMHG | BODY MASS INDEX: 24.66 KG/M2

## 2025-07-14 PROBLEM — N95.1 VAGINAL DRYNESS, MENOPAUSAL: Status: RESOLVED | Noted: 2025-03-10 | Resolved: 2025-07-14

## 2025-07-14 PROBLEM — N95.1 VAGINAL DRYNESS, MENOPAUSAL: Status: ACTIVE | Noted: 2025-07-14

## 2025-07-14 PROBLEM — Z78.0 MENOPAUSE: Status: ACTIVE | Noted: 2025-07-14

## 2025-07-14 PROBLEM — N95.2 VAGINAL ATROPHY: Status: RESOLVED | Noted: 2025-03-10 | Resolved: 2025-07-14

## 2025-07-14 PROBLEM — Z01.419 WELL WOMAN EXAM WITH ROUTINE GYNECOLOGICAL EXAM: Status: RESOLVED | Noted: 2024-07-17 | Resolved: 2025-07-14

## 2025-07-14 PROBLEM — Z90.710 HISTORY OF HYSTERECTOMY: Status: ACTIVE | Noted: 2025-07-14

## 2025-07-14 PROBLEM — Z01.419 WELL WOMAN EXAM WITH ROUTINE GYNECOLOGICAL EXAM: Status: ACTIVE | Noted: 2025-07-14

## 2025-07-14 PROBLEM — Z90.710 HISTORY OF HYSTERECTOMY: Status: RESOLVED | Noted: 2024-07-17 | Resolved: 2025-07-14

## 2025-07-14 PROBLEM — N95.2 VAGINAL ATROPHY: Status: ACTIVE | Noted: 2025-07-14

## 2025-07-14 LAB
CARD LOT #: NORMAL
CARD LOT EXP DATE: NORMAL
DATE COLLECTED: NORMAL
DATE COLLECTED: NORMAL
DEVELOPER LOT #: NORMAL
DEVELOPER LOT EXP DATE: NORMAL
HEMOCCULT 2: NEGATIVE
HEMOCCULT SP1 STL QL: NEGATIVE
QUALITY CONTROL: YES
QUALITY CONTROL: YES

## 2025-07-14 PROCEDURE — 99396 PREV VISIT EST AGE 40-64: CPT | Mod: 25

## 2025-07-14 PROCEDURE — 82270 OCCULT BLOOD FECES: CPT

## 2025-07-14 RX ORDER — ESTRADIOL 0.1 MG/G
0.1 CREAM VAGINAL
Qty: 1 | Refills: 3 | Status: ACTIVE | COMMUNITY
Start: 2025-07-14 | End: 1900-01-01

## 2025-07-16 LAB
CYTOLOGY CVX/VAG DOC THIN PREP: NORMAL
HPV HIGH+LOW RISK DNA PNL CVX: NOT DETECTED

## 2025-08-15 ENCOUNTER — NON-APPOINTMENT (OUTPATIENT)
Age: 55
End: 2025-08-15